# Patient Record
Sex: MALE | Race: WHITE | NOT HISPANIC OR LATINO | Employment: OTHER | URBAN - METROPOLITAN AREA
[De-identification: names, ages, dates, MRNs, and addresses within clinical notes are randomized per-mention and may not be internally consistent; named-entity substitution may affect disease eponyms.]

---

## 2017-03-27 ENCOUNTER — OFFICE VISIT - HEALTHEAST (OUTPATIENT)
Dept: FAMILY MEDICINE | Facility: CLINIC | Age: 82
End: 2017-03-27

## 2017-03-27 DIAGNOSIS — Z48.02 VISIT FOR SUTURE REMOVAL: ICD-10-CM

## 2017-03-27 ASSESSMENT — MIFFLIN-ST. JEOR: SCORE: 1477.72

## 2017-05-02 ENCOUNTER — OFFICE VISIT - HEALTHEAST (OUTPATIENT)
Dept: FAMILY MEDICINE | Facility: CLINIC | Age: 82
End: 2017-05-02

## 2017-05-02 DIAGNOSIS — I10 ESSENTIAL HYPERTENSION WITH GOAL BLOOD PRESSURE LESS THAN 140/90: ICD-10-CM

## 2017-05-02 DIAGNOSIS — I48.0 PAROXYSMAL ATRIAL FIBRILLATION (H): ICD-10-CM

## 2017-05-02 DIAGNOSIS — E78.00 HYPERCHOLESTEREMIA: ICD-10-CM

## 2017-05-02 LAB
CHOLEST SERPL-MCNC: 113 MG/DL
FASTING STATUS PATIENT QL REPORTED: YES
HDLC SERPL-MCNC: 41 MG/DL
LDLC SERPL CALC-MCNC: 61 MG/DL
TRIGL SERPL-MCNC: 54 MG/DL

## 2017-05-03 ENCOUNTER — COMMUNICATION - HEALTHEAST (OUTPATIENT)
Dept: FAMILY MEDICINE | Facility: CLINIC | Age: 82
End: 2017-05-03

## 2017-10-17 ENCOUNTER — COMMUNICATION - HEALTHEAST (OUTPATIENT)
Dept: FAMILY MEDICINE | Facility: CLINIC | Age: 82
End: 2017-10-17

## 2017-10-17 DIAGNOSIS — I10 HYPERTENSION: ICD-10-CM

## 2017-11-09 ENCOUNTER — OFFICE VISIT - HEALTHEAST (OUTPATIENT)
Dept: FAMILY MEDICINE | Facility: CLINIC | Age: 82
End: 2017-11-09

## 2017-11-09 DIAGNOSIS — E78.00 HYPERCHOLESTEREMIA: ICD-10-CM

## 2017-11-09 DIAGNOSIS — Z00.01 ENCOUNTER FOR GENERAL ADULT MEDICAL EXAMINATION WITH ABNORMAL FINDINGS: ICD-10-CM

## 2017-11-09 DIAGNOSIS — I48.0 PAROXYSMAL ATRIAL FIBRILLATION (H): ICD-10-CM

## 2017-11-09 DIAGNOSIS — R63.4 ABNORMAL WEIGHT LOSS: ICD-10-CM

## 2017-11-09 DIAGNOSIS — I25.10 CORONARY ARTERY DISEASE INVOLVING NATIVE CORONARY ARTERY OF NATIVE HEART WITHOUT ANGINA PECTORIS: ICD-10-CM

## 2017-11-09 DIAGNOSIS — I10 ESSENTIAL HYPERTENSION WITH GOAL BLOOD PRESSURE LESS THAN 140/90: ICD-10-CM

## 2017-11-09 DIAGNOSIS — R53.83 FATIGUE: ICD-10-CM

## 2017-11-09 LAB
CHOLEST SERPL-MCNC: 129 MG/DL
FASTING STATUS PATIENT QL REPORTED: YES
HDLC SERPL-MCNC: 41 MG/DL
LDLC SERPL CALC-MCNC: 72 MG/DL
TRIGL SERPL-MCNC: 81 MG/DL

## 2017-11-09 ASSESSMENT — MIFFLIN-ST. JEOR: SCORE: 1424.99

## 2017-11-10 ENCOUNTER — COMMUNICATION - HEALTHEAST (OUTPATIENT)
Dept: FAMILY MEDICINE | Facility: CLINIC | Age: 82
End: 2017-11-10

## 2018-02-05 ENCOUNTER — COMMUNICATION - HEALTHEAST (OUTPATIENT)
Dept: FAMILY MEDICINE | Facility: CLINIC | Age: 83
End: 2018-02-05

## 2018-02-05 ENCOUNTER — OFFICE VISIT - HEALTHEAST (OUTPATIENT)
Dept: FAMILY MEDICINE | Facility: CLINIC | Age: 83
End: 2018-02-05

## 2018-02-05 DIAGNOSIS — I48.0 PAROXYSMAL ATRIAL FIBRILLATION (H): ICD-10-CM

## 2018-02-05 DIAGNOSIS — J11.1 INFLUENZA: ICD-10-CM

## 2018-02-05 DIAGNOSIS — R26.81 UNSTEADINESS: ICD-10-CM

## 2018-02-05 DIAGNOSIS — J18.9 COMMUNITY ACQUIRED PNEUMONIA OF RIGHT LOWER LOBE OF LUNG: ICD-10-CM

## 2018-02-05 DIAGNOSIS — I95.9 HYPOTENSION: ICD-10-CM

## 2018-02-05 ASSESSMENT — MIFFLIN-ST. JEOR: SCORE: 1448.24

## 2018-02-07 ENCOUNTER — OFFICE VISIT - HEALTHEAST (OUTPATIENT)
Dept: FAMILY MEDICINE | Facility: CLINIC | Age: 83
End: 2018-02-07

## 2018-02-07 DIAGNOSIS — Z09 FOLLOW-UP EXAMINATION: ICD-10-CM

## 2018-02-07 ASSESSMENT — MIFFLIN-ST. JEOR: SCORE: 1452.78

## 2018-02-13 ENCOUNTER — COMMUNICATION - HEALTHEAST (OUTPATIENT)
Dept: FAMILY MEDICINE | Facility: CLINIC | Age: 83
End: 2018-02-13

## 2018-03-28 ENCOUNTER — COMMUNICATION - HEALTHEAST (OUTPATIENT)
Dept: FAMILY MEDICINE | Facility: CLINIC | Age: 83
End: 2018-03-28

## 2018-03-28 ENCOUNTER — RECORDS - HEALTHEAST (OUTPATIENT)
Dept: GENERAL RADIOLOGY | Facility: CLINIC | Age: 83
End: 2018-03-28

## 2018-03-28 ENCOUNTER — OFFICE VISIT - HEALTHEAST (OUTPATIENT)
Dept: FAMILY MEDICINE | Facility: CLINIC | Age: 83
End: 2018-03-28

## 2018-03-28 DIAGNOSIS — J18.9 PNEUMONIA, UNSPECIFIED ORGANISM: ICD-10-CM

## 2018-03-28 DIAGNOSIS — J18.9 CAP (COMMUNITY ACQUIRED PNEUMONIA): ICD-10-CM

## 2018-03-28 DIAGNOSIS — I48.0 PAROXYSMAL ATRIAL FIBRILLATION (H): ICD-10-CM

## 2018-03-28 DIAGNOSIS — I50.32 CHRONIC DIASTOLIC HEART FAILURE (H): ICD-10-CM

## 2018-03-28 DIAGNOSIS — I10 ESSENTIAL HYPERTENSION WITH GOAL BLOOD PRESSURE LESS THAN 140/90: ICD-10-CM

## 2018-03-28 DIAGNOSIS — D69.6 THROMBOCYTOPENIA (H): ICD-10-CM

## 2018-03-28 LAB
ANION GAP SERPL CALCULATED.3IONS-SCNC: 6 MMOL/L (ref 5–18)
BNP SERPL-MCNC: 465 PG/ML (ref 0–93)
BUN SERPL-MCNC: 13 MG/DL (ref 8–28)
CALCIUM SERPL-MCNC: 9.1 MG/DL (ref 8.5–10.5)
CHLORIDE BLD-SCNC: 106 MMOL/L (ref 98–107)
CO2 SERPL-SCNC: 31 MMOL/L (ref 22–31)
CREAT SERPL-MCNC: 0.82 MG/DL (ref 0.7–1.3)
ERYTHROCYTE [DISTWIDTH] IN BLOOD BY AUTOMATED COUNT: 12.7 % (ref 11–14.5)
GFR SERPL CREATININE-BSD FRML MDRD: >60 ML/MIN/1.73M2
GLUCOSE BLD-MCNC: 76 MG/DL (ref 70–125)
HCT VFR BLD AUTO: 43.1 % (ref 40–54)
HGB BLD-MCNC: 14.6 G/DL (ref 14–18)
MCH RBC QN AUTO: 34.5 PG (ref 27–34)
MCHC RBC AUTO-ENTMCNC: 33.9 G/DL (ref 32–36)
MCV RBC AUTO: 102 FL (ref 80–100)
PLATELET # BLD AUTO: 208 THOU/UL (ref 140–440)
PMV BLD AUTO: 8.1 FL (ref 7–10)
POTASSIUM BLD-SCNC: 4.3 MMOL/L (ref 3.5–5)
RBC # BLD AUTO: 4.24 MILL/UL (ref 4.4–6.2)
SODIUM SERPL-SCNC: 143 MMOL/L (ref 136–145)
WBC: 6.4 THOU/UL (ref 4–11)

## 2018-09-19 ENCOUNTER — OFFICE VISIT - HEALTHEAST (OUTPATIENT)
Dept: FAMILY MEDICINE | Facility: CLINIC | Age: 83
End: 2018-09-19

## 2018-09-19 DIAGNOSIS — N13.8 BPH WITH URINARY OBSTRUCTION: ICD-10-CM

## 2018-09-19 DIAGNOSIS — I10 ESSENTIAL HYPERTENSION WITH GOAL BLOOD PRESSURE LESS THAN 140/90: ICD-10-CM

## 2018-09-19 DIAGNOSIS — Z00.01 ENCOUNTER FOR GENERAL ADULT MEDICAL EXAMINATION WITH ABNORMAL FINDINGS: ICD-10-CM

## 2018-09-19 DIAGNOSIS — I25.10 CORONARY ARTERY DISEASE INVOLVING NATIVE CORONARY ARTERY OF NATIVE HEART WITHOUT ANGINA PECTORIS: ICD-10-CM

## 2018-09-19 DIAGNOSIS — D75.89 MACROCYTOSIS: ICD-10-CM

## 2018-09-19 DIAGNOSIS — N40.1 BPH WITH URINARY OBSTRUCTION: ICD-10-CM

## 2018-09-19 DIAGNOSIS — I48.0 PAROXYSMAL ATRIAL FIBRILLATION (H): ICD-10-CM

## 2018-09-19 DIAGNOSIS — I50.32 CHRONIC DIASTOLIC HEART FAILURE (H): ICD-10-CM

## 2018-09-19 LAB
ANION GAP SERPL CALCULATED.3IONS-SCNC: 8 MMOL/L (ref 5–18)
BNP SERPL-MCNC: 237 PG/ML (ref 0–93)
BUN SERPL-MCNC: 14 MG/DL (ref 8–28)
CALCIUM SERPL-MCNC: 9.3 MG/DL (ref 8.5–10.5)
CHLORIDE BLD-SCNC: 104 MMOL/L (ref 98–107)
CHOLEST SERPL-MCNC: 113 MG/DL
CO2 SERPL-SCNC: 31 MMOL/L (ref 22–31)
CREAT SERPL-MCNC: 0.84 MG/DL (ref 0.7–1.3)
ERYTHROCYTE [DISTWIDTH] IN BLOOD BY AUTOMATED COUNT: 11.8 % (ref 11–14.5)
FASTING STATUS PATIENT QL REPORTED: YES
FOLATE SERPL-MCNC: 13.6 NG/ML
GFR SERPL CREATININE-BSD FRML MDRD: >60 ML/MIN/1.73M2
GLUCOSE BLD-MCNC: 80 MG/DL (ref 70–125)
HCT VFR BLD AUTO: 44.2 % (ref 40–54)
HDLC SERPL-MCNC: 44 MG/DL
HGB BLD-MCNC: 15.2 G/DL (ref 14–18)
LDLC SERPL CALC-MCNC: 55 MG/DL
MCH RBC QN AUTO: 33.9 PG (ref 27–34)
MCHC RBC AUTO-ENTMCNC: 34.4 G/DL (ref 32–36)
MCV RBC AUTO: 99 FL (ref 80–100)
PLATELET # BLD AUTO: 166 THOU/UL (ref 140–440)
PMV BLD AUTO: 8.3 FL (ref 7–10)
POTASSIUM BLD-SCNC: 3.9 MMOL/L (ref 3.5–5)
RBC # BLD AUTO: 4.49 MILL/UL (ref 4.4–6.2)
SODIUM SERPL-SCNC: 143 MMOL/L (ref 136–145)
TRIGL SERPL-MCNC: 69 MG/DL
VIT B12 SERPL-MCNC: 251 PG/ML (ref 213–816)
WBC: 6.3 THOU/UL (ref 4–11)

## 2018-09-19 ASSESSMENT — MIFFLIN-ST. JEOR: SCORE: 1430.1

## 2018-09-20 ENCOUNTER — COMMUNICATION - HEALTHEAST (OUTPATIENT)
Dept: FAMILY MEDICINE | Facility: CLINIC | Age: 83
End: 2018-09-20

## 2020-10-03 ENCOUNTER — COMMUNICATION - HEALTHEAST (OUTPATIENT)
Dept: SCHEDULING | Facility: CLINIC | Age: 85
End: 2020-10-03

## 2020-10-05 ENCOUNTER — COMMUNICATION - HEALTHEAST (OUTPATIENT)
Dept: SCHEDULING | Facility: CLINIC | Age: 85
End: 2020-10-05

## 2020-10-05 ENCOUNTER — COMMUNICATION - HEALTHEAST (OUTPATIENT)
Dept: CARDIOLOGY | Facility: CLINIC | Age: 85
End: 2020-10-05

## 2020-10-14 ENCOUNTER — COMMUNICATION - HEALTHEAST (OUTPATIENT)
Dept: VASCULAR SURGERY | Facility: CLINIC | Age: 85
End: 2020-10-14

## 2021-05-26 ENCOUNTER — RECORDS - HEALTHEAST (OUTPATIENT)
Dept: ADMINISTRATIVE | Facility: CLINIC | Age: 86
End: 2021-05-26

## 2021-05-27 ENCOUNTER — RECORDS - HEALTHEAST (OUTPATIENT)
Dept: ADMINISTRATIVE | Facility: CLINIC | Age: 86
End: 2021-05-27

## 2021-05-29 ENCOUNTER — RECORDS - HEALTHEAST (OUTPATIENT)
Dept: ADMINISTRATIVE | Facility: CLINIC | Age: 86
End: 2021-05-29

## 2021-05-30 VITALS — BODY MASS INDEX: 23.33 KG/M2 | WEIGHT: 172 LBS

## 2021-05-30 VITALS — HEIGHT: 72 IN | BODY MASS INDEX: 23.43 KG/M2 | WEIGHT: 173 LBS

## 2021-05-31 VITALS — BODY MASS INDEX: 22.96 KG/M2 | WEIGHT: 164 LBS | HEIGHT: 71 IN

## 2021-06-01 VITALS — BODY MASS INDEX: 21.74 KG/M2 | HEIGHT: 73 IN | WEIGHT: 164 LBS

## 2021-06-01 VITALS — WEIGHT: 160 LBS | BODY MASS INDEX: 21.11 KG/M2

## 2021-06-01 VITALS — BODY MASS INDEX: 21.6 KG/M2 | HEIGHT: 73 IN | WEIGHT: 163 LBS

## 2021-06-02 VITALS — WEIGHT: 159 LBS | HEIGHT: 73 IN | BODY MASS INDEX: 21.07 KG/M2

## 2021-06-09 NOTE — PROGRESS NOTES
Assessment:     1. Visit for suture removal         Plan:     1. Visit for suture removal  Wound care was discussed patient is fused loosefitting socks      Subjective:   This patient is being seen for stitch removal he somehow lacerated his left ankle area near the external malleolus about 11:00 on retiring he states that it was squirting blood he could not get it stopped he went to the emergency room 3 stitches were placed apparently the patient somehow lacerated a small artery he does take a baby aspirin he presents today the wound looks fine 3 stitches vinyl tape were removed without incident no bleeding I did discuss that the patient should wear some loose fitting socks and to be careful because of his thin skin patient understands all questions that were asked were answered first visit for this patient with me    Review of Systems: A complete 14 point review of systems was obtained and is negative or as stated in the history of present illness.    Past Medical History:   Diagnosis Date     Hypertension      Myocardial infarction      Family History   Problem Relation Age of Onset     Stroke Father      Past Surgical History:   Procedure Laterality Date     CORONARY STENT PLACEMENT      x 3     Social History   Substance Use Topics     Smoking status: Former Smoker     Smokeless tobacco: Never Used     Alcohol use No         Objective:   /88  Pulse 67  Ht 6' (1.829 m)  Wt 173 lb (78.5 kg)  SpO2 97%  BMI 23.46 kg/m2    General Appearance:  Normal  Head:  Normal  Ears: Normal  Eyes:  Normal  Nose:  Normal  Throat:  Normal  Neck:  Normal  Back:  Normal  Chest/Breast:Normal  Lungs:  Normal  Heart:  Normal  Abdomen:  Normal  Musculoskeletal:  Normal  Lymphatic:  Normal  Skin/Hair/Nails: 3 stitches removed from left ankle near external malleolus  Neurologic:  Normal  Extremities:  Normal  Genitourinary: Normal  Pulses:  Normal           This note has been dictated using voice recognition software. Any  grammatical or context distortions are unintentional and inherent to the the software.

## 2021-06-10 NOTE — PROGRESS NOTES
Assessment:    1. Paroxysmal atrial fibrillation  Basic Metabolic Panel    Thyroid Stimulating Hormone (TSH)   2. Essential hypertension with goal blood pressure less than 140/90  Basic Metabolic Panel   3. Hypercholesteremia  Lipid Cascade         Plan:    Appears to be in sinus rhythm currently.  Will check basic metabolic panel for medication monitoring with underlying hypertension and paroxysmal atrial fibrillation.  Ensure normal TSH level.  Check fasting lipid cascade today with history of hyperlipidemia currently on simvastatin 40 mg using half tablet at bedtime.  Remains on atenolol 50 mg twice daily lisinopril 10 mg using half tablet once daily for hypertension management and rate control.  Anticipate annual wellness visit in 6 months.  Immunizations reviewed and up-to-date.  Blood pressure on recheck 136/84.        Subjective:    Gavin Patel is seen today for follow-up evaluation.  Doing well.  Blood pressures outside of office typically in the 130-140 range over 70s-80s.  Pulse is usually in the 60s.  No orthostatic hypotension described.  No palpitations.  No chest pain.  Patient has declined warfarin anticoagulation in the past as well as switching from atenolol to sotalol etc. for rate control with history of paroxysmal atrial fibrillation.  Continues tamsulosin 0.4 mg daily for BPH management otherwise is on simvastatin 40 mg using half tablet at bedtime for lipid management.  Patient is fasting would like to have labs rechecked today ideally.    Reviewed office notes from October 18, 2016 regarding new diagnosis atrial fibrillation this summer. History of coronary artery disease status post MI 1998 status post angioplasty with stent placement ×3. Underlying history of hypertension. Continues atenolol 50 mg but now taking twice daily along with lisinopril 10 mg using half tablet (5 mg) daily. [facility] fills medications except for atenolol. Prior atrial fibrillation in 2006 with subsequent  "conversion to sinus rhythm. Continues aspirin 81 mg daily. Hesitant to use warfarin anticoagulation. States blood pressure 130-150s/80-90s before increasing atenolol to twice daily. Now blood pressures 110-130s/60-70s after dose adjustment. Denies significant palpitations. No presyncopal symptoms. States feels better with blood pressures in the 140 systolic. Labs reviewed from  with magnesium level, electrolytes, TSH etc. normal. Reviewed cardiology consult note with Dr. Soler patient declines warfarin or consideration of watchman device, switch from atenolol to sotalol, cardiac stress testing etc. Request DNR/DNI code status with paperwork to be signed by PCP. Echocardiogram completed 2016 with EF 60%. No significant wall motion abnormalities noted.      after 30 years   Girlfriend   5 children originally (daughter  of breast CA) - two have DM   Quit smoking age 35 y.o. ()   EtOH: infrequent glass of wine (h/o drinking but quit on own due to heart palpitations worsening)   Surgeries: carpal tunnel  bilateral; angioplasty  with 3 stents at that time   Episode ~  of \"heart beating really fast\" -> took meds x 6 months than \"gradually went of it\"   Mom -  96 y.o. 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 Jackson,   Patient of yours referred to me for evaluation of coronary disease and atrial fibrillation.   He declines anticoagulation, would not consider watchman  since he needs to be on anticoagulation for about 4 months.   He declines stress testing to see if ischemia would be causing his atrial fibrillation.  He does not want any invasive procedures due to embolic phenomenon in  from prior stents.   I suggested switching his atenolol over to sotalol 40 mg or 80 mg twice a " day which he declines.  This would be for rhythm control.   Nothing else to add, should he decide to have us intervene please let me know.   Larry Soler    Past Surgical History:   Procedure Laterality Date     CORONARY STENT PLACEMENT      x 3        Family History   Problem Relation Age of Onset     Stroke Father         Past Medical History:   Diagnosis Date     Hypertension      Myocardial infarction         Social History   Substance Use Topics     Smoking status: Former Smoker     Smokeless tobacco: Never Used     Alcohol use No        Current Outpatient Prescriptions   Medication Sig Dispense Refill     aspirin 81 MG EC tablet Take 81 mg by mouth daily.       atenolol (TENORMIN) 50 MG tablet Take 1 tablet (50 mg total) by mouth 2 (two) times a day. (Patient taking differently: Take 50 mg by mouth daily. ) 180 tablet 1     latanoprost (XALATAN) 0.005 % ophthalmic solution Administer 1 drop to both eyes bedtime.       lisinopril (PRINIVIL,ZESTRIL) 10 MG tablet Take 0.5 tablets (5 mg total) by mouth daily. (Patient taking differently: Take 10 mg by mouth daily. ) 45 tablet 1     omega-3 fatty acids-vitamin E (FISH OIL) 1,000 mg cap Take 2 capsules by mouth daily.       simvastatin (ZOCOR) 40 MG tablet Take 20 mg by mouth bedtime.        tamsulosin (FLOMAX) 0.4 mg Cp24 Take 0.4 mg by mouth daily.       No current facility-administered medications for this visit.           Objective:    Vitals:    05/02/17 0959   BP: 140/80   Pulse: 64   Weight: 172 lb (78 kg)      Body mass index is 23.33 kg/(m^2).    Alert.  No apparent distress.  Chest clear.  Cardiac exam appears regular rate and rhythm.  No murmur or ectopy.  Abdomen benign.  Extremities warm and dry.  Weight at goal.  No peripheral edema noted.  No rash.

## 2021-06-12 NOTE — TELEPHONE ENCOUNTER
"Writer spoke with pt to see if he wanted to make a f/u appt. With Dr. Krishnamurthy from the hospital. Noted to have AAA. Pt stated,  \"that he does not want any treatment and he is just going to live it\".     Writer informed him OK to call back if he changes his mind.   "

## 2021-06-12 NOTE — TELEPHONE ENCOUNTER
Wellness Screening Tool  Symptom Screening:  Do you have one of the following NEW symptoms:    Fever (subjective or >100.0)?  No    A new cough?  No    Shortness of breath?  No     Chills? No     New loss of taste or smell? No     Generalized body aches? No     New persistent headache? No     New sore throat? No     Nausea, vomiting, or diarrhea?  No    Within the past 2 weeks, have you been exposed to someone with a known positive illness below:    COVID-19 (known or suspected)?  No    Chicken pox?  No    Mealses?  No    Pertussis?  No    Patient notified of visitor policy- They may have one person accompany them to their appointment, but they will need to wear a mask and will be screened upon arrival for symptoms: Yes  Pt informed to wear a mask: Yes  Pt notified if they develop any symptoms listed above, prior to their appointment, they are to call the clinic directly at [phone] for further instructions.  Yes  Patient's appointment status: Patient will be seen in clinic as scheduled on 10/6

## 2021-06-14 NOTE — PROGRESS NOTES
Assessment and Plan:       1. Encounter for general adult medical examination with abnormal findings  Annual wellness visit completed.  Risks associated with some dietary indiscretions as well as chronic hearing loss for which he uses bilateral hearing aids with benefit.  Immunizations reviewed and up-to-date.  Annual wellness visits to continue.    2. Essential hypertension with goal blood pressure less than 140/90  Blood pressure recheck 166/86 today.  Patient continues atenolol 50 mg twice daily as well as lisinopril 10 mg using half tablet daily and states feels better when blood pressures are around 140/80 and states blood pressures at home often lower than this however diastolic blood pressures can be into the 90s at times.  Patient declines further medication adjustments and will reassess at follow-up per patient request late March 2018.  - Comprehensive Metabolic Panel    3. Paroxysmal atrial fibrillation  Appears to remain in sinus rhythm.  Magnesium and electrolytes pending regarding atrial fibrillation monitoring.  Also TSH.  Patient declines follow-up with Dr. Soler or cardiology and has declined watchman device, warfarin anticoagulation, switch from atenolol to sotalol etc.  - Comprehensive Metabolic Panel  - Magnesium    4. Hypercholesteremia  Patient requests repeat lipid cascade today and continue simvastatin 40 mg using half tablet at bedtime.  - Lipid [place]    5. Coronary artery disease involving native coronary artery of native heart without angina pectoris  Discussed stable CAD without recurrent chest pain etc. status post angioplasty with stent placement ×3 in 1998.    6. Fatigue  Discussed fatigue likely multifactorial however we will check comprehensive metabolic panel, TSH and CBC today.  Patient declines echocardiogram for further evaluation of left ventricular systolic function etc. with history of CAD, paroxysmal atrial fibrillation, etc.  - HM2(CBC w/o Differential)  - Thyroid  Stimulating Hormone (TSH)  - Comprehensive Metabolic Panel    7. Abnormal weight loss  Discussed likely 40 pound weight loss however feels her weight has stabilized.  Patient declines further intervention at this time however did complete lab monitoring as noted above.  Declines chest x-ray, abdominal/pelvis CT etc.  Reassess at scheduled follow-up office visit to ensure stable or improved weight with BMI 22.7 at this time.       The patient's current medical problems were reviewed.    I have had an Advance Directives discussion with the patient.     The following health maintenance schedule was reviewed with the patient and provided in printed form in the after visit summary:   Health Maintenance   Topic Date Due     ADVANCE DIRECTIVES DISCUSSED WITH PATIENT  01/05/1948     ZOSTER VACCINE  01/05/1990     FALL RISK ASSESSMENT  05/02/2018     TD 18+ HE  02/04/2026     PNEUMOCOCCAL POLYSACCHARIDE VACCINE AGE 65 AND OVER  Completed     INFLUENZA VACCINE RULE BASED  Completed     PNEUMOCOCCAL CONJUGATE VACCINE FOR ADULTS (PCV13 OR PREVNAR)  Completed        Subjective:   Chief Complaint: Gavin Patel is an 87 y.o. male here for an Annual Wellness visit.     HPI: 87-year-old gentleman seen today for annual wellness visit.  In general doing well however does have fatigue issues.  Nonspecific.  Has had nearly 40 pound weight loss over the years and does not have as much of an appetite however feels that weight loss has stabilized and does not feel that is necessarily a concern.  Still maintaining muscle tone.  Using atenolol 50 mg twice daily and lisinopril 10 mg using half tablet daily for hypertension management with history of paroxysmal atrial fibrillation.  Has not noted recent palpitations.  History of known history of CAD status post MI 1998 with subsequent angioplasty and stent placement ×3.  Denies fevers or chills.  No nausea vomiting.  No diarrhea or constipation.  No abdominal complaints.      "after 30 years   Girlfriend   5 children originally (daughter  of breast CA) - two have DM   Quit smoking age 35 y.o. ()   EtOH: infrequent glass of wine (h/o drinking but quit on own due to heart palpitations worsening)   Surgeries: carpal tunnel  bilateral; angioplasty  with 3 stents at that time   Episode ~  of \"heart beating really fast\" -> took meds x 6 months than \"gradually went of it\"   Mom -  96 y.o. 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 Jcakson,   Patient of yours referred to me for evaluation of coronary disease and atrial fibrillation.   He declines anticoagulation, would not consider watchman  since he needs to be on anticoagulation for about 4 months.   He declines stress testing to see if ischemia would be causing his atrial fibrillation.  He does not want any invasive procedures due to embolic phenomenon in  from prior stents.   I suggested switching his atenolol over to sotalol 40 mg or 80 mg twice a day which he declines.  This would be for rhythm control.   Nothing else to add, should he decide to have us intervene please let me know.   Larry Soler    Review of Systems:  Please see above.  The rest of the review of systems are negative for all systems.    Patient Care Team:  Hiro Jackson MD as PCP - General     Patient Active Problem List   Diagnosis     Stroke Syndrome     Polymyalgia Rheumatica     Skin Neoplasm Of Uncertain Behavior     Sinus Bradycardia     Urinary Frequency More Than Twice At Night (Nocturia)     Coronary Arteriosclerosis     Benign Prostatic Hypertrophy With Urinary Obstruction     Hypercholesteremia     Hypertension     Atrial Fibrillation     Allergic Rhinitis     Vitamin B12 Deficiency     Carpal Tunnel Syndrome     Lumbar Disc Degeneration     " "Osteoarthritis Of The Knee     Fatigue     Normochromic normocytic anemia     Atypical pigmented skin lesion     Past Medical History:   Diagnosis Date     Hypertension      Myocardial infarction       Past Surgical History:   Procedure Laterality Date     CORONARY STENT PLACEMENT      x 3      Family History   Problem Relation Age of Onset     Stroke Father       Social History     Social History     Marital status:      Spouse name: N/A     Number of children: N/A     Years of education: N/A     Occupational History     Not on file.     Social History Main Topics     Smoking status: Former Smoker     Smokeless tobacco: Never Used     Alcohol use No     Drug use: No     Sexual activity: Not on file     Other Topics Concern     Not on file     Social History Narrative      Current Outpatient Prescriptions   Medication Sig Dispense Refill     aspirin 81 MG EC tablet Take 81 mg by mouth daily.       atenolol (TENORMIN) 50 MG tablet TAKE 1 TABLET(50 MG) BY MOUTH TWICE DAILY 180 tablet 1     latanoprost (XALATAN) 0.005 % ophthalmic solution Administer 1 drop to both eyes bedtime.       lisinopril (PRINIVIL,ZESTRIL) 10 MG tablet Take 0.5 tablets (5 mg total) by mouth daily. (Patient taking differently: Take 10 mg by mouth daily. ) 45 tablet 1     omega-3 fatty acids-vitamin E (FISH OIL) 1,000 mg cap Take 2 capsules by mouth daily.       simvastatin (ZOCOR) 40 MG tablet Take 20 mg by mouth bedtime.        tamsulosin (FLOMAX) 0.4 mg Cp24 Take 0.4 mg by mouth daily.       No current facility-administered medications for this visit.       Objective:   Vital Signs:   Visit Vitals     /90     Pulse (!) 56     Ht 5' 11.25\" (1.81 m)     Wt 164 lb (74.4 kg)     BMI 22.71 kg/m2        VisionScreening:  No exam data present     PHYSICAL EXAM    General Appearance:    Alert, cooperative, no distress, appears stated age.  Transfers independently without significant difficulty.   Head:    Normocephalic, without obvious " abnormality, atraumatic   Eyes:    PERRL, conjunctiva/corneas clear, EOM's intact, fundi     benign, both eyes        Ears:    Normal TM's and external ear canals, both ears.  Bilateral hearing aids in place.   Nose:   Nares normal, septum midline, mucosa normal, no drainage    or sinus tenderness   Throat:   Lips, mucosa, and tongue normal; upper denture with partial lower.   Neck:   Supple, symmetrical, trachea midline, no adenopathy;        thyroid:  No enlargement/tenderness/nodules; no carotid    bruit or JVD   Back:     Symmetric, no curvature, ROM normal, no CVA tenderness   Lungs:     Clear to auscultation bilaterally, respirations unlabored   Chest wall:    No tenderness or deformity   Heart:    Regular rate and rhythm, S1 and S2 normal, no murmur, rub   or gallop   Abdomen:     Soft, non-tender, bowel sounds active all four quadrants,     no masses, no organomegaly.     Genitalia:    Normal male without lesion, discharge or tenderness.  No inguinal hernia noted.     Rectal:    Normal tone.  Prostate mildly enlarged/symmetric, no masses or tenderness.   Extremities:   Extremities normal, atraumatic, no cyanosis or edema   Pulses:   2+ and symmetric all extremities   Skin:   Skin color, texture, turgor normal, no rashes.  Multiple benign seborrheic keratoses noted.   Lymph nodes:   Cervical, supraclavicular, and axillary nodes normal   Neurologic:   CNII-XII intact. Normal strength, sensation and reflexes       throughout        Assessment Results 11/9/2017   Activities of Daily Living No help needed   Instrumental Activities of Daily Living No help needed   Get Up and Go Score Less than 12 seconds   Mini Cog Total Score 3   Some recent data might be hidden     A Mini-Cog score of 0-2 suggests the possibility of dementia, score of 3-5 suggests no dementia    Identified Health Risks:     The patient was counseled and encouraged to consider modifying their diet and eating habits. He was provided with  information on recommended healthy diet options.  The patient was provided with written information regarding signs of hearing loss.  Patient's advanced directive was discussed and I am comfortable with the patient's wishes.         TTE procedure:ECHO COMPLETE.  Procedure Date  Date: 08/18/2016 Start: 09:58 AM     Study Location: [facility]  Technical Quality: Adequate visualization     Patient Status: Routine     Height: 72 inches Weight: 170 pounds BSA: 1.99 m^2 BMI: 23.06 kg/m^2     BP: 120/80 mmHg     Indications  Atrial fibrillation.      Conclusions       Summary   Normal left ventricular size and systolic function.   Left ventricular ejection fraction is calculated to be 60%.   Mild aortic regurgitation.

## 2021-06-15 NOTE — PROGRESS NOTES
Assessment/Plan:     Patient presents to clinic for follow-up from emergency department visit where he was diagnosed with mild dehydration which responded well to gentle fluid resuscitation, influenza but too late for Tamiflu, and community-acquired pneumonia treated with Levaquin.  Since that time he has been focused on resting, taking his medication as indicated, and careful ambulation as he does feel more wobbly and weak.  He has not fallen.    Encourage patient to continue the antibiotics.  Encouraged patient to focus on hydration, especially something like Pedialyte or boost which also has calories patient explained some anorexia during his period of illness.  Decreased patient's atenolol from 50 mg twice daily to 50 mg once daily during this period of hypotension likely secondary to dehydration.  In addition, patient is feeling unsteady on his feet, do not want any orthostatic hypotension to increase the odds of a fall.    Patient originally registered at 82% at rest for oxygen saturation, but his fingertips were very cold.  This was repeated after warming and was hovering around 90%.  Patient endorsed no shortness of breath during the examination, no increased work of breathing, and was able to speak in full sentences.  Discussed with patient that if he has any worsening shortness of breath it is imperative that he calls 911 and not to wait.  Patient was amenable to this plan, and will follow up in 2 days in clinic.  They will call with any worsening symptoms.    Patient needs a repeat chest x-ray in approximately 4-6 weeks to follow-up on the image view during his emergency department visit.    Patient has a history of atrial fibrillation, currently feels unsteady on his feet, will not initiate any blood thinning medication at this time, is unlikely that this will ever be started given patient's age and comorbidities.  Will have a longer discussion and patient is feeling better, or refer back to primary  care provider.    Problem List Items Addressed This Visit        ENT/CARD/PULM/ENDO Problems    Atrial Fibrillation - Primary      Other Visit Diagnoses     Influenza        Community acquired pneumonia of right lower lobe of lung        Unsteadiness        Hypotension              AVS printed and given to patient.  Return to clinic in 2 days.    Total time spent with patient was 45 minutes with greater than 50% spent in face-to-face counseling regarding the above plan.    This note has been dictated using voice recognition software. Any grammatical or context distortions are unintentional and inherent to the the software.     Anita Delaney MD  Family Medicine [facility]      Subjective:      Gavin Patel is a 88 y.o. male who presents to clinic for follow-up from emergency department visit.    Patient presented in the emergency department on February 1 and was diagnosed with influenza, but too late for treatment with Tamiflu.  He was also diagnosed community-acquired pneumonia and was initiated on Levaquin.  He was also found to be in atrial fibrillation which she has been in previously.  He is not currently on any blood thinning medication aside from a baby aspirin.  The chest x-ray at that visit demonstrated since a suspicion for a lobar pneumonia in the right lower lobe, but with a recommendation of the radiologist to follow-up with an additional x-ray in 4-6 weeks.  Was also found to be dehydrated and gentle fluid resuscitation improved patient's symptoms during his brief period in the hospital.    Since that admission patient does feel slightly better but also feels weak and wobbly.  He states he understands he was dehydrated and presented to the emergency department but has been focusing on hydration at home.  He states he still could likely be drinking more.  He does feel weaker than normal, and needs to use a cane to walk around his house which is atypical for him.  He does not live alone,  "but his housemate is a female friend who is of equal infirmity and was also hospitalized the same time the patient presented emergency department for influenza.    He denies significant shortness of breath, but does state that he gets \"winded\" more frequently when he exerts himself.  He has been trying to take it easy since he was seen in the emergency department.  Denies fever.  He denies significant cough although there is still some additions improved from his ED visit.      Past Medical History, Family History, and Social History reviewed.     Review of systems is as stated in HPI.  Patient endorses: fever, weakness, fatigue, hearing changes, dizziness, hoarseness, wheezing, SOB, swelling in legs, diarrhea and easy bruising  The remainder of the 10 system review is otherwise negative.    Objective:     /80  Pulse 74  Temp 97.5  F (36.4  C)  Ht 6' 1\" (1.854 m)  Wt 163 lb (73.9 kg)  SpO2 91% Comment: RA at rest  BMI 21.51 kg/m2 Body mass index is 21.51 kg/(m^2).    Gen: Alert, NAD, appears stated age, normal hygiene   Eyes: conjunctivae without injection, sclera clear, EOMI  ENT/mouth: nares clear, septum midline, absent rhinorrhea, throat without injection, neck is supple, no thyroid enlargement, hard of hearing  CV: suspected atrial fibrillation as irregular rhythm, no murmur appreciated, pedal edema absent bilaterally  Resp: CTAB, no wheezes, rales or ronchi but poor resp effort  ABD: normoactive, non-tender to palpation, nondistended  MSK: grossly full range of motion in all joints, no obvious deformity  Neuro: Poor facial movement on the left side, no deficits in coordination  Psych: no apparent hallucinations or delusions, no pressured speech; alert, oriented x3  SKIN: dry and without lesions  Heme/lymph: no pallor, no active bleeding/bruising, no adenopathy appreciated      Current Outpatient Prescriptions on File Prior to Visit   Medication Sig Dispense Refill     aspirin 81 MG EC tablet Take " 81 mg by mouth at bedtime.        atenolol (TENORMIN) 50 MG tablet Take 50 mg by mouth 2 (two) times a day.       latanoprost (XALATAN) 0.005 % ophthalmic solution Administer 1 drop to both eyes bedtime.       levoFLOXacin (LEVAQUIN) 500 MG tablet Take 1.5 tablets (750 mg total) by mouth daily for 9 days. (Patient taking differently: Take 500 mg by mouth daily. ) 9 tablet 0     lisinopril (PRINIVIL,ZESTRIL) 10 MG tablet Take 0.5 tablets (5 mg total) by mouth daily. 45 tablet 1     omega-3 fatty acids-vitamin E (FISH OIL) 1,000 mg cap Take 2 capsules by mouth daily.       simvastatin (ZOCOR) 40 MG tablet Take 20 mg by mouth bedtime.        tamsulosin (FLOMAX) 0.4 mg Cp24 Take 0.4 mg by mouth daily as needed.        No current facility-administered medications on file prior to visit.

## 2021-06-15 NOTE — PROGRESS NOTES
Assessment/Plan:     Patient presents to clinic today for follow-up after a decrease in oxygen saturation appreciated 2 days ago and a known history of both influenza and suspected community-acquired pneumonia status post antibiotic treatment.  He is also previously unsteady on his feet but today appears much more steady.  He has been taking a protein, and his oxygen saturation today was 97%.  He feels well.  Encouraged patient to call immediately if he has any concerns of her shortness of breath or unsteadiness, but otherwise care as normal.    Patient's atrial fibrillation has now been appreciated on several different examinations.  I still feel he is too unsteady to initiate on anticoagulation.  This will need to be addressed with his primary care or with myself at a later date.    Problem List Items Addressed This Visit     None      Visit Diagnoses     Follow-up examination    -  Primary          Total time spent with patient was 15 minutes with greater than 50% spent in face-to-face counseling regarding the above plan.    This note has been dictated using voice recognition software. Any grammatical or context distortions are unintentional and inherent to the the software.     Anita Delaney MD  Family Medicine [facility]      Subjective:      Gavin Patel is a 88 y.o. male who presents to clinic for follow-up after his visit 2 days ago.  Patient was recently seen in the emergency department for shortness of breath secondary to suspected influenza and likely bacterial community-acquired right lower lobe pneumonia.  Patient was unsteady on his feet, and had a very low oxygen saturation.  He does not have any home oxygen.  His oxygen saturation at that visit was approximately 90% which is atypical for the patient.  Had previously encouraged rest, monitoring for shortness of breath, and boost shakes or any increase in calories.    Patient has been doing well since that time.  He endorses very  "minimal shortness of breath.  His resting appropriately.  He is still using a cane but he does this in the [place] in general.  He feels much more steady on his feet.  He has been drinking boost but does not like the protein bars his daughter keeps bringing him.      Past Medical History, Family History, and Social History reviewed.     Review of systems is as stated in HPI.  The remainder of the 10 system review is otherwise negative.    Objective:     /68  Pulse 73  Ht 6' 1\" (1.854 m)  Wt 164 lb (74.4 kg)  SpO2 97%  BMI 21.64 kg/m2 Body mass index is 21.64 kg/(m^2).    Gen: Alert, NAD, appears stated age, normal hygiene   CV: irregular rhythm, no murmur appreciated, pedal edema absent bilaterally  Resp: CTAB, no wheezes, rales or ronchi; improved examination from last appointment  ABD: normoactive, non-tender to palpation, nondistended  MSK: grossly full range of motion in all joints, no obvious deformity  Neuro: Left facial hemiparesis  Psych: no apparent hallucinations or delusions, no pressured speech; alert, oriented x3  SKIN: dry and without lesions  Heme/lymph: no pallor, no active bleeding/bruising, no adenopathy appreciated      Current Outpatient Prescriptions on File Prior to Visit   Medication Sig Dispense Refill     aspirin 81 MG EC tablet Take 81 mg by mouth at bedtime.        atenolol (TENORMIN) 50 MG tablet Take 50 mg by mouth 2 (two) times a day.       latanoprost (XALATAN) 0.005 % ophthalmic solution Administer 1 drop to both eyes bedtime.       levoFLOXacin (LEVAQUIN) 500 MG tablet Take 1.5 tablets (750 mg total) by mouth daily for 9 days. (Patient taking differently: Take 500 mg by mouth daily. ) 9 tablet 0     lisinopril (PRINIVIL,ZESTRIL) 10 MG tablet Take 0.5 tablets (5 mg total) by mouth daily. 45 tablet 1     omega-3 fatty acids-vitamin E (FISH OIL) 1,000 mg cap Take 2 capsules by mouth daily.       simvastatin (ZOCOR) 40 MG tablet Take 20 mg by mouth bedtime.        tamsulosin " (FLOMAX) 0.4 mg Cp24 Take 0.4 mg by mouth daily as needed.        No current facility-administered medications on file prior to visit.

## 2021-06-16 PROBLEM — I50.32 CHRONIC DIASTOLIC HEART FAILURE (H): Status: ACTIVE | Noted: 2018-09-19

## 2021-06-16 PROBLEM — I25.10 CORONARY ARTERY DISEASE INVOLVING NATIVE CORONARY ARTERY OF NATIVE HEART WITHOUT ANGINA PECTORIS: Status: ACTIVE | Noted: 2018-09-19

## 2021-06-16 PROBLEM — D75.89 MACROCYTOSIS: Status: ACTIVE | Noted: 2018-09-19

## 2021-06-16 NOTE — PROGRESS NOTES
Assessment/Plan:    1. Essential hypertension with goal blood pressure less than 140/90  Hypertension stable with blood pressure 142/86 and patient declines further medication adjustment stating that he feels better with blood pressure slightly elevated.  Continues lisinopril 10 mg using half tablet daily plus atenolol 50 mg twice daily.  - Basic Metabolic Panel    2. Paroxysmal atrial fibrillation  Continues aspirin 81 mg daily.  Exam today consistent with sinus rhythm with ventricular rate 64 bpm.  Likely paroxysmal atrial fibrillation history.  Basic metabolic panel pending for med monitoring and continues atenolol 50 mg twice daily for rate control and aspirin 81 mg for anticoagulation with falls risk etc. poor candidate for warfarin anticoagulation.  - Basic Metabolic Panel    3. CAP (community acquired pneumonia)  Described prior community-acquired pneumonia with influenza A results positive February 1, 2018.  Repeat chest x-ray was obtained today and will have radiologist review to ensure resolved abnormalities.  - XR Chest 2 Views; Future    4. Thrombocytopenia  Repeat CBC with mild thrombocytopenia, resolved with platelet count 208,000.  - HM2(CBC w/o Differential)    5. Chronic diastolic heart failure  Likely chronic diastolic heart failure.  Will repeat BNP level which had been elevated at 584 February 1, 2018 otherwise no evidence for systolic heart failure with prior echocardiogram with normal ejection fraction etc.  - BNP(B-type Natriuretic Peptide)          Subjective:    Gavin DEVYN Patel is seen today for follow-up evaluation.  Underlying hypertension.  Continues lisinopril 10 mg using half tablet daily and atenolol 50 mg twice daily.  No palpitations.  No orthopnea or PND.  No presyncopal symptoms.  Known history of CAD in the past otherwise no recurrence of angina etc.  Simvastatin 40 mg using half tablet at bedtime.  Tamsulosin 0.4 mg daily for BPH management.  No peripheral edema.  Patient has  "declined follow-up with Dr. Soler or cardiology and has declined watchman device, warfarin anticoagulation, switch from atenolol to sotalol, etc.  Discussed stable CAD without recurrent chest pain etc. status post angioplasty with stent placement ×3 in .     after 30 years   Girlfriend   5 children originally (daughter  of breast CA) - two have DM   Quit smoking age 35 y.o. ()   EtOH: infrequent glass of wine (h/o drinking but quit on own due to heart palpitations worsening)   Surgeries: carpal tunnel  bilateral; angioplasty  with 3 stents at that time   Episode ~  of \"heart beating really fast\" -> took meds x 6 months than \"gradually went of it\"   Mom -  96 y.o. 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        atenolol (TENORMIN) 50 MG tablet Take 50 mg by mouth 2 (two) times a day.       latanoprost (XALATAN) 0.005 % ophthalmic solution Administer 1 drop to both eyes bedtime.       lisinopril (PRINIVIL,ZESTRIL) 10 MG tablet Take 0.5 tablets (5 mg total) by mouth daily. 45 tablet 1     omega-3 fatty acids-vitamin E (FISH OIL) 1,000 mg cap Take 2 capsules by mouth daily.       simvastatin (ZOCOR) 40 MG tablet Take 20 mg by mouth bedtime.        tamsulosin (FLOMAX) 0.4 " mg Cp24 Take 0.4 mg by mouth daily as needed.        No current facility-administered medications for this visit.           Objective:    Vitals:    03/28/18 0952 03/28/18 1019   BP: 140/80 142/86   Pulse: 64    SpO2: 98%    Weight: 160 lb (72.6 kg)       Body mass index is 21.11 kg/(m^2).    Alert.  No apparent distress.  Chest with diminished breath sounds without expiratory wheeze.  Cardiac exam today does appear regular in nature and rate controlled.  Extremities warm and dry without peripheral edema.  No rash.        XR CHEST 2 VIEWS  2/1/2018 11:51 AM     INDICATION: Cough.  COMPARISON: None.     FINDINGS:      The lungs are grossly clear on the frontal view, however, on the lateral view mild opacity projects over the lower lobes and pneumonia is possible. Recommend 4-6 week follow-up for clearing.      No pleural effusion or pneumothorax. Normal heart size. Coronary coronary artery stenting. Tortuous aorta. Degenerative changes spine.     NOTE: ABNORMAL REPORT  THE DICTATION ABOVE DESCRIBES AN ABNORMALITY FOR WHICH FOLLOW-UP IS NEEDED.           TTE procedure:ECHO COMPLETE.  Procedure Date  Date: 08/18/2016 Start: 09:58 AM      Study Location: [facility]  Technical Quality: Adequate visualization      Patient Status: Routine      Height: 72 inches Weight: 170 pounds BSA: 1.99 m^2 BMI: 23.06 kg/m^2      BP: 120/80 mmHg      Indications  Atrial fibrillation.       Conclusions       Summary   Normal left ventricular size and systolic function.   Left ventricular ejection fraction is calculated to be 60%.   Mild aortic regurgitation.          This note has been dictated using voice recognition software and as a result may contain minor grammatical errors and unintended word substitutions.

## 2021-06-20 NOTE — PROGRESS NOTES
Assessment and Plan:       1. Encounter for general adult medical examination with abnormal findings  Annual wellness visit completed.  Risks associated with suboptimal diet.  Prior fall after misstep at home.  Hearing loss that he does utilize bilateral hearing aids with benefit.  Indication of potential anxiety however BASILIA 7 questionnaire 121.  Patient defers high-dose flu shot until later in October otherwise immunizations reviewed and up-to-date.  Did recommend Shingrix immunization through local pharmacy however.    2. Essential hypertension with goal blood pressure less than 140/90  Hypertension, stable with blood pressure 138/82 and continues lisinopril 10 mg using half tablet daily and atenolol 50 mg twice daily.  - Basic Metabolic Panel    3. Paroxysmal atrial fibrillation (H)  History of paroxysmal atrial fibrillation.  Continues sinus rhythm at this time.  Med monitoring completed.  - Basic Metabolic Panel    4. Chronic diastolic heart failure (H)  Check BNP level as well as basic metabolic panel for chronic diastolic heart failure.  Prior echocardiogram in 2016 with EF 60%.  No orthopnea or PND.  No peripheral edema concerns.  We will continue to monitor closely.  - Basic Metabolic Panel  - BNP(B-type Natriuretic Peptide)    5. Coronary artery disease involving native coronary artery of native heart without angina pectoris  History of CAD, stable.  Asymptomatic without exertional dyspnea, chest pain etc.  - Lipid Cascade    6. Macrocytosis  History of macrocytosis with prior  and hemoglobin 14.6 March 28, 2018.  Repeat CBC as well as vitamin B12 and folate levels.  - HM2(CBC w/o Differential)  - Vitamin B12  - Folate, Serum    7. BPH with urinary obstruction  Use of tamsulosin 0.4 mg every other day apparently.  Did recommend most benefit from daily use consistent.  Patient will implement this and reassess at follow-up in 6 months, sooner with concerns.        The patient's current medical  "problems were reviewed.    I have had an Advance Directives discussion with the patient.     The following health maintenance schedule was reviewed with the patient and provided in printed form in the after visit summary:   Health Maintenance   Topic Date Due     ZOSTER VACCINE  1990     FALL RISK ASSESSMENT  2018     INFLUENZA VACCINE RULE BASED (1) 2018     ADVANCE DIRECTIVES DISCUSSED WITH PATIENT  2022     TD 18+ HE  2026     PNEUMOCOCCAL POLYSACCHARIDE VACCINE AGE 65 AND OVER  Completed     PNEUMOCOCCAL CONJUGATE VACCINE FOR ADULTS (PCV13 OR PREVNAR)  Completed        Subjective:     Chief Complaint: Gavin Patel is an 88 y.o. male here for an Annual Wellness visit.     HPI: Patient seen today for annual wellness visit.  In general doing well.  Known history of hypertension.  Treated with atenolol 50 mg twice daily and lisinopril 10 mg using half tablet daily.  History of paroxysmal atrial fibrillation noted.  Prior macrocytosis with  without history of B12 or folate deficiency.  Known history of diastolic heart failure with prior BNP level elevation 584 with ejection fraction 60% on echocardiogram .  Denies concerns for polymyalgia rheumatica at this time.  Known history of CAD, stable.    Review of Systems:  Please see above.  The rest of the review of systems are negative for all systems.     after 30 years   Girlfriend   5 children originally (daughter  of breast CA) - two have DM   Quit smoking age 35 y.o. ()   EtOH: infrequent glass of wine (h/o drinking but quit on own due to heart palpitations worsening)   Surgeries: carpal tunnel 1975 bilateral; angioplasty  with 3 stents at that time   Episode ~  of \"heart beating really fast\" -> took meds x 6 months than \"gradually went of it\"   Mom -  96 y.o. 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     Patient Care Team:  Hiro Jackson MD as PCP - General     Patient Active Problem List   Diagnosis     Stroke Syndrome     Polymyalgia Rheumatica     Skin Neoplasm Of Uncertain Behavior     Sinus Bradycardia     Urinary Frequency More Than Twice At Night ([place]cturia)     Coronary Arteriosclerosis     BPH with urinary obstruction     Hypercholesteremia     Essential hypertension with goal blood pressure less than 140/90     Atrial Fibrillation     Allergic Rhinitis     Vitamin B12 Deficiency     Carpal Tunnel Syndrome     Lumbar Disc Degeneration     Osteoarthritis Of The Knee     Fatigue     Normochromic normocytic anemia     Atypical pigmented skin lesion     Macrocytosis     Coronary artery disease involving native coronary artery of native heart without angina pectoris     Chronic diastolic heart failure (H)     Past Medical History:   Diagnosis Date     Atrial fibrillation (H)      Hypertension      Myocardial infarction       Past Surgical History:   Procedure Laterality Date     CORONARY STENT PLACEMENT      x 3      Family History   Problem Relation Age of Onset     Stroke Father       Social History     Social History     Marital status:      Spouse name: N/A     Number of children: N/A     Years of education: N/A     Occupational History     Not on file.     Social History Main Topics     Smoking status: Former Smoker     Smokeless tobacco: Never Used     Alcohol use No     Drug use: No     Sexual activity: Not on file     Other Topics Concern     Not on file     Social History Narrative      Current Outpatient Prescriptions   Medication Sig Dispense Refill     aspirin 81 MG EC tablet Take 81 mg by mouth at bedtime.        atenolol (TENORMIN) 50 MG tablet Take 50 mg by mouth 2 (two) times a day.       latanoprost (XALATAN) 0.005 % ophthalmic solution Administer 1 drop to both eyes bedtime.       lisinopril  "(PRINIVIL,ZESTRIL) 10 MG tablet Take 0.5 tablets (5 mg total) by mouth daily. 45 tablet 1     omega-3 fatty acids-vitamin E (FISH OIL) 1,000 mg cap Take 2 capsules by mouth daily.       simvastatin (ZOCOR) 40 MG tablet Take 20 mg by mouth bedtime.        tamsulosin (FLOMAX) 0.4 mg Cp24 Take 0.4 mg by mouth daily as needed.        No current facility-administered medications for this visit.       Objective:   Vital Signs:   Visit Vitals     /72     Pulse 61     Ht 6' 1\" (1.854 m)     Wt 159 lb (72.1 kg)     SpO2 98%     BMI 20.98 kg/m2        VisionScreening:  No exam data present     PHYSICAL EXAM    General Appearance:    Alert, cooperative, no distress, appears stated age.     Head:    Normocephalic, without obvious abnormality, atraumatic   Eyes:    PERRL, conjunctiva/corneas clear, EOM's intact, fundi     benign, both eyes.        Ears:    Normal TM's and external ear canals, both ears.  Bilateral hearing aids.   Nose:   Nares normal, septum midline, mucosa normal, no drainage    or sinus tenderness   Throat:   Lips, mucosa, and tongue normal; teeth and gums normal   Neck:   Supple, symmetrical, trachea midline, no adenopathy;        thyroid:  No enlargement/tenderness/nodules; no carotid    bruit or JVD   Back:     Symmetric, no curvature, ROM normal, no CVA tenderness   Lungs:     Clear to auscultation bilaterally, respirations unlabored   Chest wall:    No tenderness or deformity   Heart:    Regular rate and rhythm, S1 and S2 normal, no murmur, rub   or gallop   Abdomen:     Soft, non-tender, bowel sounds active all four quadrants,     no masses, no organomegaly.     Genitalia:    Normal male without lesion, discharge or tenderness.  No inguinal hernia noted.     Rectal:    Normal tone.  Prostate mildly enlarged/symmetric, no masses or tenderness.   Extremities:   Extremities normal, atraumatic, no cyanosis or edema   Pulses:   2+ and symmetric all extremities   Skin:   Skin color, texture, turgor " normal, no rashes or lesions   Lymph nodes:   Cervical, supraclavicular, and axillary nodes normal   Neurologic:   CNII-XII intact. Normal strength, sensation and reflexes       throughout            TTE procedure:ECHO COMPLETE.  Procedure Date  Date: 08/18/2016 Start: 09:58 AM     Study Location: [facility]  Technical Quality: Adequate visualization     Patient Status: Routine     Height: 72 inches Weight: 170 pounds BSA: 1.99 m^2 BMI: 23.06 kg/m^2     BP: 120/80 mmHg     Indications  Atrial fibrillation.      Conclusions       Summary   Normal left ventricular size and systolic function.   Left ventricular ejection fraction is calculated to be 60%.   Mild aortic regurgitation.          Assessment Results 9/19/2018   Activities of Daily Living No help needed   Instrumental Activities of Daily Living No help needed   Get Up and Go Score Less than 12 seconds   Mini Cog Total Score 4   Some recent data might be hidden     A Mini-Cog score of 0-2 suggests the possibility of dementia, score of 3-5 suggests no dementia    Identified Health Risks:     The patient was counseled and encouraged to consider modifying their diet and eating habits. He was provided with information on recommended healthy diet options.  The patient was provided with written information regarding signs of hearing loss.  He is at risk for falling and has been provided with information to reduce the risk of falling at home.  Patient's advanced directive was discussed and I am comfortable with the patient's wishes.

## 2023-01-18 ENCOUNTER — HOSPITAL ENCOUNTER (OUTPATIENT)
Facility: CLINIC | Age: 88
Setting detail: OBSERVATION
Discharge: HOME | End: 2023-01-19
Attending: EMERGENCY MEDICINE | Admitting: EMERGENCY MEDICINE
Payer: MEDICARE

## 2023-01-18 ENCOUNTER — APPOINTMENT (OUTPATIENT)
Dept: RADIOLOGY | Facility: CLINIC | Age: 88
End: 2023-01-18
Attending: EMERGENCY MEDICINE
Payer: MEDICARE

## 2023-01-18 DIAGNOSIS — E53.8 VITAMIN B12 DEFICIENCY (NON ANEMIC): Primary | ICD-10-CM

## 2023-01-18 DIAGNOSIS — M62.81 GENERALIZED MUSCLE WEAKNESS: ICD-10-CM

## 2023-01-18 DIAGNOSIS — R53.81 PHYSICAL DECONDITIONING: ICD-10-CM

## 2023-01-18 DIAGNOSIS — M35.3 POLYMYALGIA RHEUMATICA (H): ICD-10-CM

## 2023-01-18 LAB
ALBUMIN UR-MCNC: 100 MG/DL
ANION GAP SERPL CALCULATED.3IONS-SCNC: 8 MMOL/L (ref 5–18)
APPEARANCE UR: CLEAR
ATRIAL RATE - MUSE: 138 BPM
BILIRUB UR QL STRIP: NEGATIVE
BUN SERPL-MCNC: 22 MG/DL (ref 8–28)
CALCIUM SERPL-MCNC: 8.1 MG/DL (ref 8.5–10.5)
CHLORIDE BLD-SCNC: 104 MMOL/L (ref 98–107)
CO2 SERPL-SCNC: 23 MMOL/L (ref 22–31)
COLOR UR AUTO: YELLOW
CREAT SERPL-MCNC: 0.83 MG/DL (ref 0.7–1.3)
DIASTOLIC BLOOD PRESSURE - MUSE: 67 MMHG
ERYTHROCYTE [DISTWIDTH] IN BLOOD BY AUTOMATED COUNT: 12.7 % (ref 10–15)
FLUAV RNA SPEC QL NAA+PROBE: NEGATIVE
FLUBV RNA RESP QL NAA+PROBE: NEGATIVE
GFR SERPL CREATININE-BSD FRML MDRD: 82 ML/MIN/1.73M2
GLUCOSE BLD-MCNC: 109 MG/DL (ref 70–125)
GLUCOSE UR STRIP-MCNC: NEGATIVE MG/DL
HCT VFR BLD AUTO: 36.6 % (ref 40–53)
HGB BLD-MCNC: 12.2 G/DL (ref 13.3–17.7)
HGB UR QL STRIP: ABNORMAL
HOLD SPECIMEN: NORMAL
HYALINE CASTS: 19 /LPF
INTERPRETATION ECG - MUSE: NORMAL
KETONES UR STRIP-MCNC: ABNORMAL MG/DL
LACTATE SERPL-SCNC: 1.6 MMOL/L (ref 0.7–2)
LEUKOCYTE ESTERASE UR QL STRIP: NEGATIVE
MAGNESIUM SERPL-MCNC: 1.7 MG/DL (ref 1.8–2.6)
MCH RBC QN AUTO: 34.4 PG (ref 26.5–33)
MCHC RBC AUTO-ENTMCNC: 33.3 G/DL (ref 31.5–36.5)
MCV RBC AUTO: 103 FL (ref 78–100)
MUCOUS THREADS #/AREA URNS LPF: PRESENT /LPF
NITRATE UR QL: NEGATIVE
P AXIS - MUSE: NORMAL DEGREES
PH UR STRIP: 6 [PH] (ref 5–7)
PLATELET # BLD AUTO: 96 10E3/UL (ref 150–450)
POTASSIUM BLD-SCNC: 3.4 MMOL/L (ref 3.5–5)
POTASSIUM BLD-SCNC: 3.5 MMOL/L (ref 3.5–5)
PR INTERVAL - MUSE: NORMAL MS
QRS DURATION - MUSE: 94 MS
QT - MUSE: 426 MS
QTC - MUSE: 535 MS
R AXIS - MUSE: -20 DEGREES
RBC # BLD AUTO: 3.55 10E6/UL (ref 4.4–5.9)
RBC URINE: 18 /HPF
RSV RNA SPEC NAA+PROBE: NEGATIVE
SARS-COV-2 RNA RESP QL NAA+PROBE: NEGATIVE
SODIUM SERPL-SCNC: 135 MMOL/L (ref 136–145)
SP GR UR STRIP: 1.02 (ref 1–1.03)
SQUAMOUS EPITHELIAL: 1 /HPF
SYSTOLIC BLOOD PRESSURE - MUSE: 114 MMHG
T AXIS - MUSE: 78 DEGREES
TROPONIN I SERPL-MCNC: 0.03 NG/ML (ref 0–0.29)
TSH SERPL DL<=0.005 MIU/L-ACNC: 0.33 UIU/ML (ref 0.3–5)
UROBILINOGEN UR STRIP-MCNC: 4 MG/DL
VENTRICULAR RATE- MUSE: 95 BPM
WBC # BLD AUTO: 12.3 10E3/UL (ref 4–11)
WBC URINE: 4 /HPF

## 2023-01-18 PROCEDURE — 99223 1ST HOSP IP/OBS HIGH 75: CPT | Mod: AI | Performed by: HOSPITALIST

## 2023-01-18 PROCEDURE — 82607 VITAMIN B-12: CPT | Performed by: HOSPITALIST

## 2023-01-18 PROCEDURE — 84484 ASSAY OF TROPONIN QUANT: CPT | Performed by: EMERGENCY MEDICINE

## 2023-01-18 PROCEDURE — C9803 HOPD COVID-19 SPEC COLLECT: HCPCS

## 2023-01-18 PROCEDURE — 82746 ASSAY OF FOLIC ACID SERUM: CPT | Performed by: HOSPITALIST

## 2023-01-18 PROCEDURE — 83605 ASSAY OF LACTIC ACID: CPT | Performed by: HOSPITALIST

## 2023-01-18 PROCEDURE — 83735 ASSAY OF MAGNESIUM: CPT | Performed by: HOSPITALIST

## 2023-01-18 PROCEDURE — 84443 ASSAY THYROID STIM HORMONE: CPT | Performed by: HOSPITALIST

## 2023-01-18 PROCEDURE — 36415 COLL VENOUS BLD VENIPUNCTURE: CPT | Performed by: HOSPITALIST

## 2023-01-18 PROCEDURE — 84132 ASSAY OF SERUM POTASSIUM: CPT | Mod: 91 | Performed by: HOSPITALIST

## 2023-01-18 PROCEDURE — 250N000013 HC RX MED GY IP 250 OP 250 PS 637: Performed by: HOSPITALIST

## 2023-01-18 PROCEDURE — G0378 HOSPITAL OBSERVATION PER HR: HCPCS

## 2023-01-18 PROCEDURE — 71045 X-RAY EXAM CHEST 1 VIEW: CPT

## 2023-01-18 PROCEDURE — 93005 ELECTROCARDIOGRAM TRACING: CPT | Performed by: EMERGENCY MEDICINE

## 2023-01-18 PROCEDURE — 36415 COLL VENOUS BLD VENIPUNCTURE: CPT | Performed by: EMERGENCY MEDICINE

## 2023-01-18 PROCEDURE — 80048 BASIC METABOLIC PNL TOTAL CA: CPT | Performed by: EMERGENCY MEDICINE

## 2023-01-18 PROCEDURE — 81001 URINALYSIS AUTO W/SCOPE: CPT | Performed by: EMERGENCY MEDICINE

## 2023-01-18 PROCEDURE — 258N000003 HC RX IP 258 OP 636: Performed by: HOSPITALIST

## 2023-01-18 PROCEDURE — 87637 SARSCOV2&INF A&B&RSV AMP PRB: CPT | Performed by: EMERGENCY MEDICINE

## 2023-01-18 PROCEDURE — 85027 COMPLETE CBC AUTOMATED: CPT | Performed by: EMERGENCY MEDICINE

## 2023-01-18 PROCEDURE — 99285 EMERGENCY DEPT VISIT HI MDM: CPT | Mod: 25,CS

## 2023-01-18 PROCEDURE — 96360 HYDRATION IV INFUSION INIT: CPT

## 2023-01-18 RX ORDER — POTASSIUM CHLORIDE 1500 MG/1
40 TABLET, EXTENDED RELEASE ORAL ONCE
Status: COMPLETED | OUTPATIENT
Start: 2023-01-18 | End: 2023-01-18

## 2023-01-18 RX ORDER — SERTRALINE HYDROCHLORIDE 25 MG/1
25 TABLET, FILM COATED ORAL DAILY
COMMUNITY

## 2023-01-18 RX ORDER — ONDANSETRON 4 MG/1
4 TABLET, ORALLY DISINTEGRATING ORAL EVERY 6 HOURS PRN
Status: DISCONTINUED | OUTPATIENT
Start: 2023-01-18 | End: 2023-01-19 | Stop reason: HOSPADM

## 2023-01-18 RX ORDER — AMOXICILLIN 250 MG
2 CAPSULE ORAL 2 TIMES DAILY PRN
Status: DISCONTINUED | OUTPATIENT
Start: 2023-01-18 | End: 2023-01-19 | Stop reason: HOSPADM

## 2023-01-18 RX ORDER — ASPIRIN 81 MG/1
81 TABLET ORAL AT BEDTIME
Status: DISCONTINUED | OUTPATIENT
Start: 2023-01-18 | End: 2023-01-19 | Stop reason: HOSPADM

## 2023-01-18 RX ORDER — BISACODYL 10 MG
10 SUPPOSITORY, RECTAL RECTAL DAILY PRN
Status: DISCONTINUED | OUTPATIENT
Start: 2023-01-18 | End: 2023-01-19 | Stop reason: HOSPADM

## 2023-01-18 RX ORDER — LISINOPRIL 20 MG/1
20 TABLET ORAL DAILY
COMMUNITY

## 2023-01-18 RX ORDER — SERTRALINE HYDROCHLORIDE 25 MG/1
25 TABLET, FILM COATED ORAL DAILY
Status: DISCONTINUED | OUTPATIENT
Start: 2023-01-19 | End: 2023-01-19 | Stop reason: HOSPADM

## 2023-01-18 RX ORDER — LISINOPRIL 20 MG/1
20 TABLET ORAL DAILY
Status: DISCONTINUED | OUTPATIENT
Start: 2023-01-19 | End: 2023-01-19 | Stop reason: HOSPADM

## 2023-01-18 RX ORDER — HEPARIN SODIUM 5000 [USP'U]/.5ML
5000 INJECTION, SOLUTION INTRAVENOUS; SUBCUTANEOUS EVERY 12 HOURS
Status: DISCONTINUED | OUTPATIENT
Start: 2023-01-18 | End: 2023-01-19 | Stop reason: HOSPADM

## 2023-01-18 RX ORDER — LATANOPROST 50 UG/ML
1 SOLUTION/ DROPS OPHTHALMIC AT BEDTIME
Status: DISCONTINUED | OUTPATIENT
Start: 2023-01-18 | End: 2023-01-19 | Stop reason: HOSPADM

## 2023-01-18 RX ORDER — ATENOLOL 25 MG/1
50 TABLET ORAL DAILY
Status: DISCONTINUED | OUTPATIENT
Start: 2023-01-19 | End: 2023-01-19

## 2023-01-18 RX ORDER — SIMVASTATIN 20 MG
20 TABLET ORAL AT BEDTIME
Status: DISCONTINUED | OUTPATIENT
Start: 2023-01-18 | End: 2023-01-19 | Stop reason: HOSPADM

## 2023-01-18 RX ORDER — ACETAMINOPHEN 325 MG/1
650 TABLET ORAL EVERY 6 HOURS PRN
Status: DISCONTINUED | OUTPATIENT
Start: 2023-01-18 | End: 2023-01-19 | Stop reason: HOSPADM

## 2023-01-18 RX ORDER — SODIUM CHLORIDE 9 MG/ML
INJECTION, SOLUTION INTRAVENOUS CONTINUOUS
Status: DISCONTINUED | OUTPATIENT
Start: 2023-01-18 | End: 2023-01-19

## 2023-01-18 RX ORDER — POLYETHYLENE GLYCOL 3350 17 G/17G
17 POWDER, FOR SOLUTION ORAL DAILY PRN
Status: DISCONTINUED | OUTPATIENT
Start: 2023-01-18 | End: 2023-01-19 | Stop reason: HOSPADM

## 2023-01-18 RX ORDER — LIDOCAINE 40 MG/G
CREAM TOPICAL
Status: DISCONTINUED | OUTPATIENT
Start: 2023-01-18 | End: 2023-01-19 | Stop reason: HOSPADM

## 2023-01-18 RX ORDER — AMOXICILLIN 250 MG
1 CAPSULE ORAL 2 TIMES DAILY PRN
Status: DISCONTINUED | OUTPATIENT
Start: 2023-01-18 | End: 2023-01-19 | Stop reason: HOSPADM

## 2023-01-18 RX ORDER — ONDANSETRON 2 MG/ML
4 INJECTION INTRAMUSCULAR; INTRAVENOUS EVERY 6 HOURS PRN
Status: DISCONTINUED | OUTPATIENT
Start: 2023-01-18 | End: 2023-01-19 | Stop reason: HOSPADM

## 2023-01-18 RX ORDER — ACETAMINOPHEN 650 MG/1
650 SUPPOSITORY RECTAL EVERY 6 HOURS PRN
Status: DISCONTINUED | OUTPATIENT
Start: 2023-01-18 | End: 2023-01-19 | Stop reason: HOSPADM

## 2023-01-18 RX ADMIN — SIMVASTATIN 20 MG: 20 TABLET, FILM COATED ORAL at 22:16

## 2023-01-18 RX ADMIN — SODIUM CHLORIDE 500 ML: 9 INJECTION, SOLUTION INTRAVENOUS at 18:31

## 2023-01-18 RX ADMIN — LATANOPROST 1 DROP: 50 SOLUTION/ DROPS OPHTHALMIC at 22:14

## 2023-01-18 RX ADMIN — POTASSIUM CHLORIDE 40 MEQ: 1500 TABLET, EXTENDED RELEASE ORAL at 22:16

## 2023-01-18 RX ADMIN — SODIUM CHLORIDE 1000 ML: 9 INJECTION, SOLUTION INTRAVENOUS at 17:42

## 2023-01-18 RX ADMIN — SODIUM CHLORIDE: 9 INJECTION, SOLUTION INTRAVENOUS at 20:13

## 2023-01-18 RX ADMIN — ASPIRIN 81 MG: 81 TABLET, COATED ORAL at 22:16

## 2023-01-18 ASSESSMENT — ACTIVITIES OF DAILY LIVING (ADL)
ADLS_ACUITY_SCORE: 37
ADLS_ACUITY_SCORE: 37
ADLS_ACUITY_SCORE: 28
ADLS_ACUITY_SCORE: 35
ADLS_ACUITY_SCORE: 28
ADLS_ACUITY_SCORE: 31

## 2023-01-18 NOTE — PHARMACY-ADMISSION MEDICATION HISTORY
Pharmacy Note - Admission Medication History    Pertinent Provider Information: none     ______________________________________________________________________    Prior To Admission (PTA) med list completed and updated in EMR.       PTA Med List   Medication Sig Last Dose     aspirin 81 MG EC tablet [ASPIRIN 81 MG EC TABLET] Take 81 mg by mouth at bedtime.  1/17/2023     atenolol (TENORMIN) 50 MG tablet Take 50 mg by mouth daily 1/18/2023     latanoprost (XALATAN) 0.005 % ophthalmic solution [LATANOPROST (XALATAN) 0.005 % OPHTHALMIC SOLUTION] Administer 1 drop to both eyes bedtime. 1/17/2023     lisinopril (ZESTRIL) 20 MG tablet Take 20 mg by mouth daily 1/17/2023     sertraline (ZOLOFT) 25 MG tablet Take 25 mg by mouth daily 1/18/2023     simvastatin (ZOCOR) 40 MG tablet [SIMVASTATIN (ZOCOR) 40 MG TABLET] Take 20 mg by mouth bedtime.  1/17/2023       Information source(s): Patient, Family member and [facility]  Method of interview communication: in-person    Summary of Changes to PTA Med List  New: all      Patient was asked about OTC/herbal products specifically.  PTA med list reflects this.        The information provided in this note is only as accurate as the sources available at the time of the update(s).    Thank you for the opportunity to participate in the care of this patient.    Miguelangel Giron RPH  1/18/2023 2:07 PM

## 2023-01-18 NOTE — ED TRIAGE NOTES
Called [facility]el[facility] yesterday saying that he wasn't feeling well.    Daughter arrived at patients and he was in bed, soiled with urine and stool.     Patient reports taking a COVID test yesterday and it was negative.    Patient's initial blood pressure was 92/xxx  After 500 ml [facility] repeated blood pressure 110/xxx.    May have had BM en route.

## 2023-01-18 NOTE — H&P
[facility]    History and Physical - Hospitalist Service       Date of Admission:  1/18/2023    Assessment & Plan      Gavin Patel is a 89 year old male admitted on 1/18/2023. He has a hx of HTN, afib, HLD, polymyalgia rheumatica presents with generalized weakness and physical deconditioning, and had hypotension in the ED.    Acute Hypotension - occurring after initial [facility] intake  -MAP < 65 mmHg - IVF bolus ordered STAT  -Lactic acid ordered STAT  -Hold home atenolol and lisinopril (none given yet)  -Discussed POC with RN directly    Generalized Weakness  Physical Deconditioning  ?Vitamin B12 deficiency  -Discontinue potential deliriogenic medications/polypharmacy.   -Check thyroid cascade (TSH first) as well as cyanocobalamin and folic acid levels - evaluate/screen for endocrine and metabolic etiologies, respectively.   -Last B12 checked in 2020 was already borderline low in 250s. Follow-up ordered B12.   -Delirium precautions.   -Fall precautions.   -If agitated or develops acute delirium, utilize verbal redirection first, and involve patient's family/contacts if available. Pharmacologic as-needed interventions as second-line, and would only judiciously consider 1:1 sitter and/or restraints if patient becomes a physical danger to self and/or to others/staff or if previous interventions unsuccessful or not effective.  -PT  -OT  -CM/SW for disposition/placement/facility / safety discharge planning    HTN  Atrial Fibrillation   -Order home medications and ordered specified hold parameters given acute hypotension - hold home atenolol and lisinopril.   -Optimize potassium and magnesium. Keep K > 4.0, Mag > 2.0.   -Consider RN potassium and magnesium replacement protocols.   -If any acute sustained rapid rates develop, obtain EKG to evaluate for RVR and initiate cardiac telemetry and update [facility] team.     HLD  -Order home statin.       Diet: Regular Diet Adult    DVT Prophylaxis: Heparin  SQ, Pneumatic Compression Devices and Anti-embolisim stockings (TEDs)  Cunningham Catheter: Not present  Lines: None     Cardiac Monitoring: None  Code Status:    DNR/DNI    Clinically Significant Risk Factors Present on Admission                # Thrombocytopenia: Lowest platelets = 96 in last 2 days, will monitor for bleeding   # Hypertension: home medication list includes antihypertensive(s)              Disposition Plan      Expected Discharge Date: 01/19/2023                  Peter Ruiz MD  Hospitalist Service  [facility]  Securely message with [employer] (more info)  Text page via [employer] Paging/Directory     ______________________________________________________________________    Chief Complaint   Generalized weakness, unable to care for self at home    History is obtained from the patient    History of Present Illness   Gavin Patel is a 89 year old male admitted on 1/18/2023. He has a hx of HTN, afib, HLD, polymyalgia rheumatica presents with generalized weakness and physical deconditioning, and had hypotension in the ED.    His daughter found him acting out of his usual and failing to follow-through on check-ups and usual routine; he also endorses past several days of difficulty ambulating, completing usual ADLs/iADLs. Otherwise, there is no endorsement of any fevers, rigors, chest pain or shortness of breath, nausea or vomiting or abdominal pain, changes in bowel movements/pattern, urinary symptoms, focal weakness, or any other new and associated symptoms at this time. 14 point review of systems performed without any other pertinent positives at this time.     The social history, family history, and past medical history were directly reviewed. All questions the patient had at this time were answered to verbalized and stated satisfaction and understanding. Code status was discussed and the patient confirmed wishes for DNR/DNI for this hospitalization.      Past Medical History     No past medical history on file.    Past Surgical History   Past Surgical History:   Procedure Laterality Date     CORONARY STENT PLACEMENT      x 3       Prior to Admission Medications   Prior to Admission Medications   Prescriptions Last Dose Informant Patient Reported? Taking?   MEDICATION CANNOT BE REORDERED - PLEASE MANUALLY REORDER AND DISCONTINUE THE OLD ORDER   Yes No   Sig: [OMEGA-3 FATTY ACIDS-VITAMIN E (FISH OIL) 1,000 MG CAP] Take 2 capsules by mouth daily.   aspirin 81 MG EC tablet 1/17/2023  Yes Yes   Sig: [ASPIRIN 81 MG EC TABLET] Take 81 mg by mouth at bedtime.    atenolol (TENORMIN) 50 MG tablet 1/18/2023  Yes Yes   Sig: Take 50 mg by mouth daily   latanoprost (XALATAN) 0.005 % ophthalmic solution 1/17/2023  Yes Yes   Sig: [LATANOPROST (XALATAN) 0.005 % OPHTHALMIC SOLUTION] Administer 1 drop to both eyes bedtime.   lisinopril (ZESTRIL) 20 MG tablet 1/17/2023  Yes Yes   Sig: Take 20 mg by mouth daily   sertraline (ZOLOFT) 25 MG tablet 1/18/2023  Yes Yes   Sig: Take 25 mg by mouth daily   simvastatin (ZOCOR) 40 MG tablet 1/17/2023  Yes Yes   Sig: [SIMVASTATIN (ZOCOR) 40 MG TABLET] Take 20 mg by mouth bedtime.       Facility-Administered Medications: None        Physical Exam   Vital Signs: Temp: 98.1  F (36.7  C) Temp src: Oral BP: 91/56 Pulse: 86   Resp: 25 SpO2: 93 % O2 Device: None (Room air)    Weight: 155 lbs 12.8 oz    GENERAL:  Alert, appears comfortable, in no acute distress, appears stated age   HEAD:  Normocephalic, without obvious abnormality, atraumatic   EYES:  PERRL, conjunctiva/corneas clear, no scleral icterus, EOM's intact   NOSE: Nares normal, septum midline, mucosa normal, no drainage   THROAT: Lips, mucosa, and tongue normal; teeth and gums normal, mouth moist   NECK: Supple, symmetrical, trachea midline   BACK:   Symmetric, no curvature, ROM normal   LUNGS:   Clear to auscultation bilaterally, no rales, rhonchi, or wheezing, symmetric chest rise on inhalation, respirations  unlabored   CHEST WALL:  No tenderness or deformity   HEART:  Regular rate and rhythm, S1 and S2 normal, no murmur, rub, or gallop    ABDOMEN:   Soft, non-tender, bowel sounds active all four quadrants, no masses, no organomegaly, no rebound or guarding   EXTREMITIES: Extremities normal, atraumatic, no cyanosis or edema    SKIN: Dry to touch, no exanthems in the visualized areas   NEURO: Alert, oriented x 4, moves all four extremities freely/spontaneously   PSYCH: Cooperative, behavior is appropriate      Medical Decision Making     81 MINUTES SPENT BY ME on the date of service doing chart review, history, exam, documentation & further activities per the note.      Data     I have personally reviewed the following data over the past 24 hrs:    12.3 (H)  \   12.2 (L)   / 96 (L)     135 (L) 104 22 /  109   3.5 23 0.83 \       Imaging results reviewed over the past 24 hrs:   Recent Results (from the past 24 hour(s))   XR Chest Port 1 View    Narrative    EXAM: XR CHEST PORT 1 VIEW  LOCATION: [facility]  DATE/TIME: 1/18/2023 2:26 PM    INDICATION: cough  COMPARISON: None.      Impression    IMPRESSION: No acute cardiopulmonary abnormality.

## 2023-01-18 NOTE — ED NOTES
Verbal OK to access Care Everywhere for the [facility].  Ekaterina Nolan RN 1/18/2023 1:03 PM

## 2023-01-18 NOTE — ED PROVIDER NOTES
EMERGENCY DEPARTMENT ENCOUnter      NAME: Gavin Patel  AGE: 89 year old male  YOB: 1930  MRN: [id number]  EVALUATION DATE & TIME: 1/18/2023 12:38 PM    PCP: Hiro Jackson    ED PROVIDER: Mango Portillo DO      Chief Complaint   Patient presents with     Dizziness     Diarrhea         FINAL IMPRESSION:  1. Generalized muscle weakness          ED COURSE & MEDICAL DECISION MAKING:    The patient presented to the emergency department today complaining of generalized fatigue.  He has not even been able to get out of bed to go to use the bathroom.  No concerning physical exam findings.  Today laboratory testing is grossly unremarkable but given the extent of his fatigue we will plan to keep him overnight in the hospital for further monitoring and possible placement.  His daughter was at the bedside and provided much of the history.  CT scan of the brain considered but we elected to hold off on this given his lack of focal neurologic symptoms.    Medical Decision Making    History:    Supplemental history from: Family Member/Significant Other    External Record(s) reviewed: Documented in chart, if applicable.    Work Up:    Chart documentation includes differential considered and any EKGs or imaging independently interpreted by provider, where specified.    In additional to work up documented, I considered the following work up: Documented in chart, if applicable.    External consultation:    Discussion of management with another provider: Documented in chart, if applicable    Complicating factors:    Care impacted by chronic illness: N/A    Care affected by social determinants of health: N/A    Disposition considerations: Admit.        At the conclusion of the encounter I discussed the results of all of the tests and the disposition. The questions were answered. The patient or family acknowledged understanding and was agreeable with the care plan.        =================================================================    HPI        Gavin Patel is a 89 year old male who presents to the emergency department today with complaints of generalized fatigue for the past few days.  His daughter is at the bedside and states that he does not seem to be himself lately.  He has had chills at home and a recent cough.  He states he feels very fatigued and is having a hard time getting around.  He was not able to get to the bathroom recently.  He denies any focal chest pain or abdominal pain.  No other current concerns.      REVIEW OF SYSTEMS     Constitutional: Positive for chills  HENT:  Denies sore throat   Respiratory:  Denies cough or shortness of breath   Cardiovascular:  Denies chest pain or palpitations  GI:  Denies abdominal pain, nausea, or vomiting  Musculoskeletal:  Denies any new extremity pain   Skin:  Denies rash   Neurologic:  Denies headache, focal weakness or sensory changes    All other systems reviewed and are negative      PAST MEDICAL HISTORY:  No past medical history on file.    PAST SURGICAL HISTORY:  Past Surgical History:   Procedure Laterality Date     CORONARY STENT PLACEMENT      x 3           CURRENT MEDICATIONS:    No current outpatient medications on file.      ALLERGIES:  No Known Allergies    FAMILY HISTORY:  Family History   Problem Relation Age of Onset     Cerebrovascular Disease Father        SOCIAL HISTORY:   Social History     Socioeconomic History     Marital status:    Tobacco Use     Smoking status: Former     Smokeless tobacco: Never   Substance and Sexual Activity     Alcohol use: No     Drug use: No       VITALS:  Patient Vitals for the past 24 hrs:   BP Temp Temp src Pulse Resp SpO2   01/19/23 1258 (!) 147/96 98.6  F (37  C) Oral 74 18 95 %   01/19/23 0918 95/58 -- -- -- -- --   01/19/23 0917 129/65 -- -- 69 -- 95 %   01/19/23 0826 117/68 97.6  F (36.4  C) Oral 63 18 95 %   01/19/23 0330 115/71 97.3  F (36.3  C)  Oral 60 18 96 %   01/19/23 0142 96/60 -- -- -- -- --   01/19/23 0008 106/66 97.1  F (36.2  C) Oral 59 20 92 %   01/18/23 2306 120/69 -- -- -- -- --   01/18/23 2110 109/79 -- -- -- -- --   01/18/23 2006 109/76 97.4  F (36.3  C) Oral 75 25 93 %   01/18/23 1900 112/73 -- -- 74 -- 92 %   01/18/23 1831 -- -- -- 81 29 93 %   01/18/23 1820 (!) 86/55 -- -- 71 17 93 %   01/18/23 1816 -- -- -- 77 20 93 %   01/18/23 1801 (!) 87/55 -- -- 81 22 94 %   01/18/23 1746 91/56 -- -- 80 24 94 %   01/18/23 1738 91/56 -- -- 86 25 93 %   01/18/23 1731 -- -- -- 86 (!) 31 92 %   01/18/23 1728 (!) 79/51 -- -- 81 26 91 %   01/18/23 1718 (!) 81/44 -- -- 83 23 91 %   01/18/23 1716 -- -- -- 90 (!) 35 90 %   01/18/23 1708 -- -- -- 88 23 91 %   01/18/23 1701 -- -- -- 85 22 92 %   01/18/23 1658 (!) 82/50 -- -- 92 (!) 32 91 %   01/18/23 1646 103/66 -- -- 93 (!) 34 92 %   01/18/23 1631 -- -- -- 90 30 91 %   01/18/23 1616 -- -- -- 98 27 92 %   01/18/23 1601 -- -- -- 86 20 94 %   01/18/23 1600 107/66 -- -- 104 20 93 %   01/18/23 1546 (!) 89/64 -- -- 89 18 93 %   01/18/23 1531 -- -- -- 102 (!) 40 92 %   01/18/23 1527 -- -- -- 89 17 94 %   01/18/23 1516 92/59 -- -- 92 16 94 %   01/18/23 1501 -- -- -- 90 21 (!) 85 %   01/18/23 1457 -- -- -- 91 (!) 9 94 %   01/18/23 1446 96/52 -- -- 86 23 92 %       PHYSICAL EXAM    Constitutional:  Well developed, Well nourished, appears globally fatigued  HENT:  Normocephalic, Atraumatic, Bilateral external ears normal, Oropharynx moist, Nose normal.   Neck:  Normal range of motion, No meningismus, No stridor.   Eyes:  EOMI, Conjunctiva normal, No discharge.   Respiratory:  Normal breath sounds, No respiratory distress, No wheezing, No chest tenderness.   Cardiovascular: Normal rate, irregularly irregular rhythm, no murmurs  GI:  Soft, No tenderness, No guarding, No CVA tenderness.   Musculoskeletal:   No tenderness to palpation or major deformities noted.   Integument:  Warm, Dry, No erythema, No rash.   Neurologic:   Alert & oriented x 3, Normal motor function, Normal sensory function, No focal deficits noted.   Psychiatric:  Affect normal, Judgment normal, Mood normal.      LAB:  All pertinent labs reviewed and interpreted.  Results for orders placed or performed during the hospital encounter of 01/18/23              CBC with platelets   Result Value Ref Range    WBC Count 12.3 (H) 4.0 - 11.0 10e3/uL    RBC Count 3.55 (L) 4.40 - 5.90 10e6/uL    Hemoglobin 12.2 (L) 13.3 - 17.7 g/dL    Hematocrit 36.6 (L) 40.0 - 53.0 %     (H) 78 - 100 fL    MCH 34.4 (H) 26.5 - 33.0 pg    MCHC 33.3 31.5 - 36.5 g/dL    RDW 12.7 10.0 - 15.0 %    Platelet Count 96 (L) 150 - 450 10e3/uL   Basic metabolic panel   Result Value Ref Range    Sodium 135 (L) 136 - 145 mmol/L    Potassium 3.5 3.5 - 5.0 mmol/L    Chloride 104 98 - 107 mmol/L    Carbon Dioxide (CO2) 23 22 - 31 mmol/L    Anion Gap 8 5 - 18 mmol/L    Urea Nitrogen 22 8 - 28 mg/dL    Creatinine 0.83 0.70 - 1.30 mg/dL    Calcium 8.1 (L) 8.5 - 10.5 mg/dL    Glucose 109 70 - 125 mg/dL    GFR Estimate 82 >60 mL/min/1.73m2   UA with Microscopic reflex to Culture    Specimen: Urine, NOS   Result Value Ref Range    Color Urine Yellow Colorless, Straw, Light Yellow, Yellow    Appearance Urine Clear Clear    Glucose Urine Negative Negative mg/dL    Bilirubin Urine Negative Negative    Ketones Urine Trace (A) Negative mg/dL    Specific Gravity Urine 1.021 1.001 - 1.030    Blood Urine 0.1 mg/dL (A) Negative    pH Urine 6.0 5.0 - 7.0    Protein Albumin Urine 100 (A) Negative mg/dL    Urobilinogen Urine 4.0 (A) <2.0 mg/dL    Nitrite Urine Negative Negative    Leukocyte Esterase Urine Negative Negative    Mucus Urine Present (A) None Seen /LPF    RBC Urine 18 (H) <=2 /HPF    WBC Urine 4 <=5 /HPF    Squamous Epithelials Urine 1 <=1 /HPF    Hyaline Casts Urine 19 (H) <=2 /LPF   Troponin I (now)   Result Value Ref Range    Troponin I 0.03 0.00 - 0.29 ng/mL   Symptomatic Influenza A/B & SARS-CoV2  (COVID-19) Virus PCR Multiplex Nasopharyngeal    Specimen: Nasopharyngeal; Swab   Result Value Ref Range    Influenza A PCR Negative Negative    Influenza B PCR Negative Negative    RSV PCR Negative Negative    SARS CoV2 PCR Negative Negative         RADIOLOGY:  I have independently reviewed and interpreted the above imaging, pending the final radiology read.  XR Chest Port 1 View   Final Result   IMPRESSION: No acute cardiopulmonary abnormality.          EKG:    Atrial fibrillation at 95 bpm.  Nonspecific ST changes.  Occasional PVCs.  No signs of acute ischemia.  QRS 94 ms,  ms.    I have independently reviewed and interpreted this EKG            Mango Portillo DO  Emergency Medicine  [facility] EMERGENCY ROOM  [address] [id number] [phone]  Dept: [phone]     Mango Portillo MD  01/19/23 7118

## 2023-01-19 ENCOUNTER — APPOINTMENT (OUTPATIENT)
Dept: PHYSICAL THERAPY | Facility: CLINIC | Age: 88
End: 2023-01-19
Attending: HOSPITALIST
Payer: MEDICARE

## 2023-01-19 ENCOUNTER — APPOINTMENT (OUTPATIENT)
Dept: OCCUPATIONAL THERAPY | Facility: CLINIC | Age: 88
End: 2023-01-19
Attending: HOSPITALIST
Payer: MEDICARE

## 2023-01-19 VITALS
HEART RATE: 75 BPM | BODY MASS INDEX: 21.1 KG/M2 | OXYGEN SATURATION: 96 % | SYSTOLIC BLOOD PRESSURE: 163 MMHG | WEIGHT: 155.8 LBS | DIASTOLIC BLOOD PRESSURE: 88 MMHG | TEMPERATURE: 97.9 F | RESPIRATION RATE: 20 BRPM | HEIGHT: 72 IN

## 2023-01-19 LAB
ANION GAP SERPL CALCULATED.3IONS-SCNC: 8 MMOL/L (ref 5–18)
BASOPHILS # BLD AUTO: 0 10E3/UL (ref 0–0.2)
BASOPHILS NFR BLD AUTO: 0 %
BUN SERPL-MCNC: 21 MG/DL (ref 8–28)
CALCIUM SERPL-MCNC: 7.6 MG/DL (ref 8.5–10.5)
CHLORIDE BLD-SCNC: 110 MMOL/L (ref 98–107)
CO2 SERPL-SCNC: 17 MMOL/L (ref 22–31)
CREAT SERPL-MCNC: 0.71 MG/DL (ref 0.7–1.3)
EOSINOPHIL # BLD AUTO: 0 10E3/UL (ref 0–0.7)
EOSINOPHIL NFR BLD AUTO: 0 %
ERYTHROCYTE [DISTWIDTH] IN BLOOD BY AUTOMATED COUNT: 12.9 % (ref 10–15)
FOLATE SERPL-MCNC: 10.3 NG/ML (ref 4.6–34.8)
GFR SERPL CREATININE-BSD FRML MDRD: 86 ML/MIN/1.73M2
GLUCOSE BLD-MCNC: 76 MG/DL (ref 70–125)
HCT VFR BLD AUTO: 38.9 % (ref 40–53)
HGB BLD-MCNC: 12.9 G/DL (ref 13.3–17.7)
IMM GRANULOCYTES # BLD: 0 10E3/UL
IMM GRANULOCYTES NFR BLD: 0 %
LYMPHOCYTES # BLD AUTO: 1.4 10E3/UL (ref 0.8–5.3)
LYMPHOCYTES NFR BLD AUTO: 15 %
MAGNESIUM SERPL-MCNC: 1.7 MG/DL (ref 1.8–2.6)
MCH RBC QN AUTO: 34.6 PG (ref 26.5–33)
MCHC RBC AUTO-ENTMCNC: 33.2 G/DL (ref 31.5–36.5)
MCV RBC AUTO: 104 FL (ref 78–100)
MONOCYTES # BLD AUTO: 0.9 10E3/UL (ref 0–1.3)
MONOCYTES NFR BLD AUTO: 10 %
NEUTROPHILS # BLD AUTO: 6.7 10E3/UL (ref 1.6–8.3)
NEUTROPHILS NFR BLD AUTO: 75 %
NRBC # BLD AUTO: 0 10E3/UL
NRBC BLD AUTO-RTO: 0 /100
PLATELET # BLD AUTO: 90 10E3/UL (ref 150–450)
POTASSIUM BLD-SCNC: 3.6 MMOL/L (ref 3.5–5)
POTASSIUM BLD-SCNC: 4 MMOL/L (ref 3.5–5)
RBC # BLD AUTO: 3.73 10E6/UL (ref 4.4–5.9)
SODIUM SERPL-SCNC: 135 MMOL/L (ref 136–145)
VIT B12 SERPL-MCNC: 192 PG/ML (ref 232–1245)
WBC # BLD AUTO: 9 10E3/UL (ref 4–11)

## 2023-01-19 PROCEDURE — 82374 ASSAY BLOOD CARBON DIOXIDE: CPT | Performed by: HOSPITALIST

## 2023-01-19 PROCEDURE — 99239 HOSP IP/OBS DSCHRG MGMT >30: CPT | Performed by: HOSPITALIST

## 2023-01-19 PROCEDURE — 36415 COLL VENOUS BLD VENIPUNCTURE: CPT | Performed by: HOSPITALIST

## 2023-01-19 PROCEDURE — G0378 HOSPITAL OBSERVATION PER HR: HCPCS

## 2023-01-19 PROCEDURE — 85025 COMPLETE CBC W/AUTO DIFF WBC: CPT | Performed by: HOSPITALIST

## 2023-01-19 PROCEDURE — 250N000013 HC RX MED GY IP 250 OP 250 PS 637: Performed by: HOSPITALIST

## 2023-01-19 PROCEDURE — 97530 THERAPEUTIC ACTIVITIES: CPT | Mod: GP

## 2023-01-19 PROCEDURE — 97161 PT EVAL LOW COMPLEX 20 MIN: CPT | Mod: GP

## 2023-01-19 PROCEDURE — 97116 GAIT TRAINING THERAPY: CPT | Mod: GP

## 2023-01-19 PROCEDURE — 83735 ASSAY OF MAGNESIUM: CPT | Performed by: HOSPITALIST

## 2023-01-19 PROCEDURE — 97166 OT EVAL MOD COMPLEX 45 MIN: CPT | Mod: GO

## 2023-01-19 PROCEDURE — 80048 BASIC METABOLIC PNL TOTAL CA: CPT | Performed by: HOSPITALIST

## 2023-01-19 PROCEDURE — 97535 SELF CARE MNGMENT TRAINING: CPT | Mod: GO

## 2023-01-19 RX ORDER — LANOLIN ALCOHOL/MO/W.PET/CERES
1000 CREAM (GRAM) TOPICAL DAILY
Qty: 60 TABLET | Refills: 0 | Status: SHIPPED | OUTPATIENT
Start: 2023-01-19 | End: 2023-03-20

## 2023-01-19 RX ORDER — UREA 10 %
500 LOTION (ML) TOPICAL DAILY
Status: DISCONTINUED | OUTPATIENT
Start: 2023-01-19 | End: 2023-01-19 | Stop reason: HOSPADM

## 2023-01-19 RX ORDER — ATENOLOL 50 MG/1
50 TABLET ORAL DAILY
Status: DISCONTINUED | OUTPATIENT
Start: 2023-01-19 | End: 2023-01-19 | Stop reason: HOSPADM

## 2023-01-19 RX ADMIN — CYANOCOBALAMIN TAB 500 MCG 500 MCG: 500 TAB at 10:12

## 2023-01-19 RX ADMIN — SERTRALINE HYDROCHLORIDE 25 MG: 25 TABLET ORAL at 09:20

## 2023-01-19 ASSESSMENT — ACTIVITIES OF DAILY LIVING (ADL)
ADLS_ACUITY_SCORE: 31

## 2023-01-19 NOTE — PROGRESS NOTES
"PRIMARY DIAGNOSIS: \"GENERIC\" NURSING  OUTPATIENT/OBSERVATION GOALS TO BE MET BEFORE DISCHARGE:  1. ADLs back to baseline: No    2. Activity and level of assistance: Up with maximum assistance. Consider SW and/or PT evaluation.     3. Pain status: Pain free.    4. Return to near baseline physical activity: No     Discharge Planner Nurse   Safe discharge environment identified: No  Barriers to discharge: Yes       Entered by: Anabel Hernandez RN 01/19/2023 1:44 AM     Please review provider order for any additional goals.   Nurse to notify provider when observation goals have been met and patient is ready for discharge.  "

## 2023-01-19 NOTE — PROGRESS NOTES
01/19/23 1040   Appointment Info   Signing Clinician's Name / Credentials (PT) Daron Christina, PT, DPT   Quick Adds   Quick Adds Certification   General Information   Onset of Illness/Injury or Date of Surgery 01/18/23   Referring Physician Dr. Ruiz   Patient/Family Therapy Goals Statement (PT) Improve overall mobility   Pertinent History of Current Problem (include personal factors and/or comorbidities that impact the POC) Generalized weakness   Existing Precautions/Restrictions no known precautions/restrictions   Weight-Bearing Status - LLE full weight-bearing   Weight-Bearing Status - RLE full weight-bearing   Range of Motion (ROM)   ROM Comment WFL   Strength (Manual Muscle Testing)   Strength Comments WFL, seems generally weak   Bed Mobility   Bed Mobility supine-sit;sit-supine   Supine-Sit Grant (Bed Mobility) minimum assist (75% patient effort)   Sit-Supine Grant (Bed Mobility) supervision   Comment, (Bed Mobility) Off of art in ED   Transfers   Transfers sit-stand transfer   Sit-Stand Transfer   Sit-Stand Grant (Transfers) contact guard   Assistive Device (Sit-Stand Transfers) walker, front-wheeled   Gait/Stairs (Locomotion)   Grant Level (Gait) contact guard   Assistive Device (Gait) walker, front-wheeled   Distance in Feet 10'   Distance in Feet (Gait) 200'   Pattern (Gait) step-through   Deviations/Abnormal Patterns (Gait) cheryl decreased;stride length decreased   Clinical Impression   Criteria for Skilled Therapeutic Intervention Yes, treatment indicated   PT Diagnosis (PT) Impaired functional mobility   Influenced by the following impairments Weakness   Functional limitations due to impairments Transfers, gait   Clinical Presentation (PT Evaluation Complexity) Stable/Uncomplicated   Clinical Presentation Rationale Pt presents as medically diagnosed   Clinical Decision Making (Complexity) low complexity   Planned Therapy Interventions (PT) gait training;patient/family  education;stair training;strengthening;transfer training   Anticipated Equipment Needs at Discharge (PT) walker, rolling   Risk & Benefits of therapy have been explained care plan/treatment goals reviewed;patient   PT Total Evaluation Time   PT Eval, Low Complexity Minutes (48594) 10   Therapy Certification   Start of care date 01/19/23   Certification date from 01/19/23   Certification date to 02/19/23   Medical Diagnosis Generalized weakness   Physical Therapy Goals   PT Frequency Daily   PT Predicted Duration/Target Date for Goal Attainment 01/25/23   PT Goals Transfers;Gait   PT: Transfers Supervision/stand-by assist;Sit to/from stand   PT: Gait Supervision/stand-by assist;Rolling walker;Greater than 200 feet   Interventions   Interventions Quick Adds Gait Training;Therapeutic Activity   Therapeutic Activity   Therapeutic Activities: dynamic activities to improve functional performance Minutes (51796) 10   Symptoms Noted During/After Treatment None   Treatment Detail/Skilled Intervention Supine to sit Min A with pt pulling up on PT to get to EOB, SBA at EOB, sitting with no adverse s/s. Sit<>stand CGA. Increased time for set up. Cues for sequencing and safety.   Gait Training   Gait Training Minutes (62905) 10   Symptoms Noted During/After Treatment (Gait Training) fatigue;shortness of breath   Treatment Detail/Skilled Intervention Pt amb moderately well in the halls CGA with FWW, moving slowly but gait speed improving at end of amb, no LOB or adverse s/s. Reports some SOB after amb. O2 sats 93% on RA after activity. Cues for navigation, safety, and AD use (normally uses 4WW).   [place] Level (Gait Training) contact guard   Physical Assistance Level (Gait Training) supervision;verbal cues   Weight Bearing (Gait Training) full weight-bearing   Assistive Device (Gait Training) rolling walker   Pattern Analysis (Gait Training) swing-through gait   Gait Analysis Deviations decreased cheryl;decreased step  length   PT Discharge Planning   PT Plan Amb, transfers, LE ther exs   PT Discharge Recommendation (DC Rec) (S)  home with assist;home with home care physical therapy   PT Rationale for DC Rec Pending progress during stay at , pt currently amb well and significant distances using FWW but could use increased support at home and in future with potential [facility]/LTC placement. Recommend using most supportive AD all the time to reduce fall risk. Help from family or homecare services as able.   PT Brief overview of current status CGA for all mobility, Min A to get to edge of art   [facility]  OUTPATIENT PHYSICAL THERAPY EVALUATION  PLAN OF TREATMENT FOR OUTPATIENT REHABILITATION  (COMPLETE FOR INITIAL CLAIMS ONLY)  Patient's Last Name, First Name, M.I.  YOB: 1930  Gavin Patel                        Provider's Name  [facility] Medical Record No.  [id number]                             Onset Date:  01/18/23   Start of Care Date:  (P) 01/19/23   Type:     _X_PT   ___OT   ___SLP Medical Diagnosis:  (P) Generalized weakness              PT Diagnosis:  (P) Impaired functional mobility Visits from SOC:  1     See note for plan of treatment, functional goals and certification details    I CERTIFY THE NEED FOR THESE SERVICES FURNISHED UNDER        THIS PLAN OF TREATMENT AND WHILE UNDER MY CARE     (Physician co-signature of this document indicates review and certification of the therapy plan).

## 2023-01-19 NOTE — PROGRESS NOTES
Care Management Discharge Note    Discharge Date: 01/19/2023       Discharge Disposition: Home, Home Care    Discharge Services:  ([facility] for RN/PT/OT/HHA)    Discharge DME: None    Discharge Transportation: family or friend will provide (alf Spears will transport)    Private pay costs discussed: discussed options for private duty Home Care and given resources       Patient/family educated on Medicare website which has current facility and service quality ratings: yes    Education Provided on the Discharge Plan: Will discharge home (daughter will assist as needed) and [facility] services for RN/PT/OT/HHA. AVS per bedside RN.    Persons Notified of Discharge Plans: patient and daughter Tory   Patient/Family in Agreement with the Plan: yes    Handoff Referral Completed: CM coordinated with patient and daughter Tory. CM coordinated with bedside RN and MD. CM coordinated with Home Care. CM updated AVS.     Additional Information:  Chart reviewed. PT/OT pending for 1pm.     PT recommendations for home with assist; home PT.   OT recommendations for home with home OT.   CM met with patient and daughter Tory (at bedside). Both agreeable to Home Care services and request for alf Spears to be the primary contact for scheduling home care appointments.     [facility] can accept for RN/PT/OT/HHA ([facility],  Liaison confirmed and able to see within 48 hours of hospital discharge). Patient and daughter accept services. Daughter will provide the transportation home.    CM updated MD and bedside RN.   CM faxed Home Care referral and discharge orders.     [facility] referral sent per protocol.     CM also provided additional resources per [facility] request: [facility], [facility], Private Duty Home Care resources. Daughter already was given contact information for [facility].     Kelly Mahajan, RN

## 2023-01-19 NOTE — PROGRESS NOTES
"A&Ox4, neuro intact. very [place]. Hearing aids w/ pt. 1-2 L overnight. Blood pressures stable overnight, ivf infusing continuously. Uses urinal. K/Mg protocol, rechecks for 1/20 am. Fragile bruised skin.   PRIMARY DIAGNOSIS: \"GENERIC\" NURSING  OUTPATIENT/OBSERVATION GOALS TO BE MET BEFORE DISCHARGE:  1. ADLs back to baseline: No    2. Activity and level of assistance: Up with standby assistance.    3. Pain status: Pain free.    4. Return to near baseline physical activity: No     Discharge Planner Nurse   Safe discharge environment identified: No  Barriers to discharge: Yes       Entered by: Anabel Hernandez RN 01/19/2023 7:33 AM     Please review provider order for any additional goals.   Nurse to notify provider when observation goals have been met and patient is ready for discharge.  "

## 2023-01-19 NOTE — PROGRESS NOTES
"Notified Dr Daniel Ruiz @ 2100 \"WWED-08 D.V: Pt PLT lab 96. would you like me to hold heparin subq dose tonight? thank you\"    Response: provider held heparin injection       Notified Dr. Ruiz @ 2305: \"WWED-08 D.V.: Per parameter, notify provider if HR less than 50. Pt flipped from afib to sinus 2302. Hr dipped to 47. Sustaining 49-50 HR. do you want the parameter modified to contact you? Thank you\"     Response: Per Dr. Ruiz, no need for telemetry. It is not ordered and patient does not need continuous monitoring.   "

## 2023-01-19 NOTE — DISCHARGE INSTRUCTIONS
Home Care Services have been arranged.  Home Care Agency: [facility]   Home Care Agency Telephone: [phone]   Services: Nursing, PT, OT and Home Health Aide  Instructions: Agency will call to schedule start of care visit within 24-48 hours of hospital discharge.

## 2023-01-19 NOTE — CONSULTS
Care Management Initial Consult    General Information  Assessment completed with: Patient, Children, Patient and daughter Tory  Type of CM/SW Visit: Initial Assessment    Primary Care Provider verified and updated as needed: Yes   Readmission within the last 30 days: no previous admission in last 30 days      Reason for Consult: discharge planning  Advance Care Planning: Advance Care Planning Reviewed: no concerns identified, other (see comments) (Declined Honoring Choices)     General Information Comments: Lives alone    Communication Assessment  Patient's communication style: spoken language (English or Bilingual)    Hearing Difficulty or Deaf: yes   Wear Glasses or Blind: yes    Cognitive  Cognitive/Neuro/Behavioral: WDL                      Living Environment:   People in home: alone     Current living Arrangements: house      Able to return to prior arrangements: yes  Living Arrangement Comments: Lives alone    Family/Social Support:  Care provided by: self, child(korina)  Provides care for: no one  Marital Status:   Children          Description of Support System: Supportive, Involved    Support Assessment: Adequate family and caregiver support, Adequate social supports    Current Resources:   Patient receiving home care services: No     Community Resources: None  Equipment currently used at home: cane, straight, walker, rolling  Supplies currently used at home: Hearing Aid Batteries    Employment/Financial:  Employment Status: retired        Financial Concerns: No concerns identified           Lifestyle & Psychosocial Needs:  Social Determinants of Health     Tobacco Use: Not on file   Alcohol Use: Not on file   Financial Resource Strain: Not on file   Food Insecurity: Not on file   Transportation Needs: Not on file   Physical Activity: Not on file   Stress: Not on file   Social Connections: Not on file   Intimate Partner Violence: Not on file   Depression: Not on file   Housing Stability: Not on file        Functional Status:  Prior to admission patient needed assistance:   Dependent ADLs:: Ambulation-walker  Dependent IADLs:: Cleaning, Shopping, Cooking, Transportation  Assesssment of Functional Status: Not at baseline with mobility, Not at  functional baseline    Mental Health Status:          Chemical Dependency Status:              Values/Beliefs:  Spiritual, Cultural Beliefs, Jewish Practices, Values that affect care:                 Additional Information:   Patient presents to the emergency department today with complaints of generalized fatigue for the past few days.  His daughter is at the bedside and states that he does not seem to be himself lately.  He has had chills at home and a recent cough.  He states he feels very fatigued and is having a hard time getting around.  He was not able to get to the bathroom recently.  He denies any focal chest pain or abdominal pain .    Lives independently alone in a private residence. Daughter Tory at bedside. Reports he s independent with most I/ADLs. Uses a straight cane or roller-walker for ambulation; no home care services; drives only to the grocery store. No highways. Manages and self-administers own medications; daughter provides some meals and patient does light cooking. Tory cleans house; provides some transportation.     Explained MOON/Observation Status to patient and daughter, answered patient's questions; both verbalized understanding.  Patient and daughter have started looking for [facility]/LTC for patient to transition to. Provided them with Elderly Resource Book; Senior [facility] card, and Senior Housing Book. Plan is for patient to return home with Tory transporting on discharge. Other needs pending clinical progress.      CAROLYN WHITE RN/CM

## 2023-01-19 NOTE — PROGRESS NOTES
Physical Therapy Discharge Summary    Reason for therapy discharge:    Discharged to home with home therapy.    Progress towards therapy goal(s). See goals on Care Plan in [facility] electronic health record for goal details.  Goals met    Therapy recommendation(s):    Continued therapy is recommended.  Rationale/Recommendations:  EMILIANA MIGUEL.

## 2023-01-19 NOTE — DISCHARGE SUMMARY
[facility]  Hospitalist Discharge Summary      Date of Admission:  1/18/2023  Date of Discharge:  1/19/2023  Discharging Provider: Peter Ruiz MD  Discharge Service: Hospitalist Service    Discharge Diagnoses   Symptomatic Vitamin B12 deficiency - moderate/severe  Generalized Weakness  Physical Deconditioning  Acute Hypotension (asymptomatic)  HTN  Atrial Fibrillation   HLD    Follow-ups Needed After Discharge   Follow-up Appointments     Follow-up and recommended labs and tests      Follow up with primary care provider, Hiro Jackson, within 7 days for   hospital follow- up.    The following labs/tests are recommended:  B12 level to be repeated in 3-4   weeks.             Unresulted Labs Ordered in the Past 30 Days of this Admission     No orders found for last 31 day(s).      These results will be followed up by NA    Discharge Disposition   Admited to home care: PT, OT, RN, HHA  Discharged to home  Condition at discharge: Good      Hospital Course      Gavin Patel is a 89 year old male admitted on 1/18/2023. He has a hx of HTN, afib, HLD, polymyalgia rheumatica presents with generalized weakness and physical deconditioning, and had asymptomatic hypotension in the ED.    Hypotension resolved with IVF, conservative management, and encouraging oral intake. Found to have moderate/severe symptomatic vitamin B12 deficiency and started on replacement; this was discussed with him and his daughter and its role in contributing to his difficulty with ambulating and ADLs/iADLs. Did call his HCA/mPOA daughter Tory and fully updated her. PT/OT recommended home healthcare services including home PT, OT, RN, and HHA and these were all ordered and arranged. PCP follow-up and repeat B12 level in several weeks recommended.     Consultations This Hospital Stay   CARE MANAGEMENT / SOCIAL WORK IP CONSULT  OCCUPATIONAL THERAPY ADULT IP CONSULT  PHYSICAL THERAPY ADULT IP CONSULT  CARE MANAGEMENT  / SOCIAL WORK IP CONSULT    Code Status   No CPR- Do NOT Intubate    Time Spent on this Encounter   I, Peter Ruiz MD, personally saw the patient today and spent greater than 30 minutes discharging this patient.       Peter Ruiz MD  [address] [address]  Phone: [phone]  Fax: [phone]  ______________________________________________________________________    Physical Exam   Vital Signs: Temp: 97.9  F (36.6  C) Temp src: Oral BP: (!) 163/88 Pulse: 75   Resp: 20 SpO2: 96 % O2 Device: None (Room air) Oxygen Delivery: 2 LPM  Weight: 155 lbs 12.8 oz  GENERAL:  Alert, appears comfortable, in no acute distress, appears stated age   HEAD:  Normocephalic, without obvious abnormality, atraumatic   EYES:  PERRL, conjunctiva/corneas clear, no scleral icterus, EOM's intact   NOSE: Nares normal, septum midline, mucosa normal, no drainage   THROAT: Lips, mucosa, and tongue normal; teeth and gums normal, mouth moist   NECK: Supple, symmetrical, trachea midline   BACK:   Symmetric, no curvature, ROM normal   LUNGS:   Clear to auscultation bilaterally, no rales, rhonchi, or wheezing, symmetric chest rise on inhalation, respirations unlabored   CHEST WALL:  No tenderness or deformity   HEART:  Regular rate and rhythm, S1 and S2 normal, no murmur, rub, or gallop    ABDOMEN:   Soft, non-tender, bowel sounds active all four quadrants, no masses, no organomegaly, no rebound or guarding   EXTREMITIES: Extremities normal, atraumatic, no cyanosis or edema    SKIN: Dry to touch, no exanthems in the visualized areas   NEURO: Alert, oriented x 4, moves all four extremities freely/spontaneously   PSYCH: Cooperative, behavior is appropriate         Primary Care Physician   Hiro Jackson    Discharge Orders      Home care nursing referral      Reason for your hospital stay    Medical evaluation and physical and occupational therapy evaluations.     You  were found to have B12, also called cyanocobalamin, deficiency. Supplementation has been started and follow-up with your own primary care physician and the B12 level should be rechecked in several weeks.   Home healthcare services have been ordered/arranged for home PT, OT, RN, and home health aide.     Follow-up and recommended labs and tests    Follow up with primary care provider, Hiro Jackson, within 7 days for hospital follow- up.    The following labs/tests are recommended:  B12 level to be repeated in 3-4 weeks.     Activity    Your activity upon discharge: activity as tolerated and no driving for today     Diet    Follow this diet upon discharge: Orders Placed This Encounter      Combination Diet Regular Diet Adult       Significant Results and Procedures   Most Recent 3 CBC's:Recent Labs   Lab Test 01/19/23  0714 01/18/23  1327 10/02/20  1102   WBC 9.0 12.3* 9.1   HGB 12.9* 12.2* 13.7*   * 103* 102*   PLT 90* 96* 168     Most Recent 3 BMP's:Recent Labs   Lab Test 01/19/23  0714 01/19/23  0230 01/18/23  2114 01/18/23  1439 10/02/20  1102   *  --   --  135* 140   POTASSIUM 4.0 3.6 3.4* 3.5 3.7   CHLORIDE 110*  --   --  104 106   CO2 17*  --   --  23 28   BUN 21  --   --  22 13   CR 0.71  --   --  0.83 0.93   ANIONGAP 8  --   --  8 6   JUNE 7.6*  --   --  8.1* 8.3*   GLC 76  --   --  109 146*     Most Recent 2 LFT's:Recent Labs   Lab Test 10/02/20  1102   AST 14   ALT 9   ALKPHOS 106   BILITOTAL 1.4*     Most Recent 3 Troponin's:No lab results found.     Vitamin B12 level is 192.  ,   Results for orders placed or performed during the hospital encounter of 01/18/23   XR Chest Port 1 View    Narrative    EXAM: XR CHEST PORT 1 VIEW  LOCATION: [facility]  DATE/TIME: 1/18/2023 2:26 PM    INDICATION: cough  COMPARISON: None.      Impression    IMPRESSION: No acute cardiopulmonary abnormality.       Discharge Medications   Current Discharge Medication List      START taking  these medications    Details   cyanocobalamin (VITAMIN B-12) 1000 MCG tablet Take 1 tablet (1,000 mcg) by mouth daily for 60 days  Qty: 60 tablet, Refills: 0    Associated Diagnoses: Generalized muscle weakness; Vitamin B12 deficiency (non anemic); Physical deconditioning         CONTINUE these medications which have NOT CHANGED    Details   aspirin 81 MG EC tablet [ASPIRIN 81 MG EC TABLET] Take 81 mg by mouth at bedtime.       atenolol (TENORMIN) 50 MG tablet Take 50 mg by mouth daily      latanoprost (XALATAN) 0.005 % ophthalmic solution [LATANOPROST (XALATAN) 0.005 % OPHTHALMIC SOLUTION] Administer 1 drop to both eyes bedtime.      lisinopril (ZESTRIL) 20 MG tablet Take 20 mg by mouth daily      sertraline (ZOLOFT) 25 MG tablet Take 25 mg by mouth daily      simvastatin (ZOCOR) 40 MG tablet [SIMVASTATIN (ZOCOR) 40 MG TABLET] Take 20 mg by mouth bedtime.       MEDICATION CANNOT BE REORDERED - PLEASE MANUALLY REORDER AND DISCONTINUE THE OLD ORDER [OMEGA-3 FATTY ACIDS-VITAMIN E (FISH OIL) 1,000 MG CAP] Take 2 capsules by mouth daily.           Allergies   No Known Allergies

## 2023-01-19 NOTE — PROGRESS NOTES
[facility]    Medicine Progress Note - Hospitalist Service    Date of Admission:  1/18/2023    Assessment & Plan      Gavin Patel is a 89 year old male admitted on 1/18/2023. He has a hx of HTN, afib, HLD, polymyalgia rheumatica presents with generalized weakness and physical deconditioning, and had hypotension in the ED.    Hypotension resolved. Found to have moderate/severe symptomatic vitamin B12 deficiency certainly contributing to his falls/recurrent falls and difficulty with ambulating and ADLs/iADLs. Did call his HCA/mPOA daughter Tory and fully updated her. PT/OT evals pending. Appreciate CM/SW assist.     Acute Hypotension - occurring after initial HMS intake  -MAP < 65 mmHg - IVF bolus ordered STAT  -Lactic acid ordered STAT  -Hold home atenolol and lisinopril (none given yet)  -Discussed POC with RN directly    Symptomatic Vitamin B12 deficiency - moderate/severe  Generalized Weakness  Physical Deconditioning  -Discontinue potential deliriogenic medications/polypharmacy.   -Check thyroid cascade (TSH first) as well as cyanocobalamin and folic acid levels - evaluate/screen for endocrine and metabolic etiologies, respectively.   -Last B12 checked in 2020 was already borderline low in 250s. Follow-up ordered B12.   -B12 is deficient, 192 and given physiologic 6-months supply/storage and level is this low, would consider this moderate/severe deficiency.   -Order 500 mcg every day to start, consider outpatient injections if oral supplementation not adequate, PCP follow-up   -Delirium precautions.   -Fall precautions.   -If agitated or develops acute delirium, utilize verbal redirection first, and involve patient's family/contacts if available. Pharmacologic as-needed interventions as second-line, and would only judiciously consider 1:1 sitter and/or restraints if patient becomes a physical danger to self and/or to others/staff or if previous interventions unsuccessful or not  effective.  -PT  -OT  -CM/SW for disposition/placement/facility / safety discharge planning    HTN  Atrial Fibrillation   -Order home medications and ordered specified hold parameters given acute hypotension - hold home atenolol and lisinopril.   -Optimize potassium and magnesium. Keep K > 4.0, Mag > 2.0.   -Consider RN potassium and magnesium replacement protocols.   -If any acute sustained rapid rates develop, obtain EKG to evaluate for RVR and initiate cardiac telemetry and update [facility] team.     HLD  -Order home statin.         Diet: Combination Diet Regular Diet Adult    DVT Prophylaxis: Pneumatic Compression Devices, Anti-embolisim stockings (TEDs) and Ambulate every shift  Cunningham Catheter: Not present  Lines: None     Cardiac Monitoring: None  Code Status: No CPR- Do NOT Intubate      Clinically Significant Risk Factors Present on Admission                # Thrombocytopenia: Lowest platelets = 90 in last 2 days, will monitor for bleeding   # Hypertension: home medication list includes antihypertensive(s)   # Acute Respiratory Failure: Documented O2 saturation < 91%.  Continue supplemental oxygen as needed             Disposition Plan      Expected Discharge Date: 01/19/2023      Destination: home;home with help/services            Peter Ruiz MD  Hospitalist Service  [facility]  Securely message with [employer] (more info)  Text page via [employer] Paging/Directory   ______________________________________________________________________    Interval History   No acute events overnight.    He remains in good spirits. Discussed his w/u with him, including noting moderate/severe B12 deficiency. No report of chest pain, shortness of breath, or other new complaints. Discussed plan of care with patient. Answered all questions to patient's verbalized understanding and satisfaction.    Did call his HCA/mPOA daughter Tory and fully updated her.    Discussed with RN directly as well.     Physical Exam    Vital Signs: Temp: 97.6  F (36.4  C) Temp src: Oral BP: 117/68 Pulse: 63   Resp: 18 SpO2: 95 % O2 Device: Nasal cannula Oxygen Delivery: 2 LPM  Weight: 155 lbs 12.8 oz    GENERAL:  Alert, appears comfortable, in no acute distress, appears stated age   HEAD:  Normocephalic, without obvious abnormality, atraumatic   EYES:  PERRL, conjunctiva/corneas clear, no scleral icterus, EOM's intact   NOSE: Nares normal, septum midline, mucosa normal, no drainage   THROAT: Lips, mucosa, and tongue normal; teeth and gums normal, mouth moist   NECK: Supple, symmetrical, trachea midline   BACK:   Symmetric, no curvature, ROM normal   LUNGS:   Clear to auscultation bilaterally, no rales, rhonchi, or wheezing, symmetric chest rise on inhalation, respirations unlabored   CHEST WALL:  No tenderness or deformity   HEART:  Regular rate and rhythm, S1 and S2 normal, no murmur, rub, or gallop    ABDOMEN:   Soft, non-tender, bowel sounds active all four quadrants, no masses, no organomegaly, no rebound or guarding   EXTREMITIES: Extremities normal, atraumatic, no cyanosis or edema    SKIN: Dry to touch, no exanthems in the visualized areas   NEURO: Alert, oriented x 4, moves all four extremities freely/spontaneously   PSYCH: Cooperative, behavior is appropriate      Medical Decision Making     58 MINUTES SPENT BY ME on the date of service doing chart review, history, exam, documentation & further activities per the note.      Data     I have personally reviewed the following data over the past 24 hrs:    9.0  \   12.9 (L)   / 90 (L)     135 (L) 110 (H) 21 /  76   4.0 17 (L) 0.71 \       TSH: 0.33 T4: N/A A1C: N/A       Procal: N/A CRP: N/A Lactic Acid: 1.6         Imaging results reviewed over the past 24 hrs:   Recent Results (from the past 24 hour(s))   XR Chest Port 1 View    Narrative    EXAM: XR CHEST PORT 1 VIEW  LOCATION: [facility]  DATE/TIME: 1/18/2023 2:26 PM    INDICATION: cough  COMPARISON: None.       Impression    IMPRESSION: No acute cardiopulmonary abnormality.

## 2023-01-19 NOTE — PROGRESS NOTES
01/19/23 1300   Appointment Info   Signing Clinician's Name / Credentials (OT) Janie Blakely, MOTR/L, CLT   Quick Adds   Quick Adds Certification   Living Environment   People in Home alone   Current Living Arrangements other (see comments);house   Home Accessibility   (one level with walk-out basement)   Transportation Anticipated family or friend will provide   Self-Care   Usual Activity Tolerance good   Current Activity Tolerance moderate   Regular Exercise Yes  (walking)   Activity/Exercise Type walking   Equipment Currently Used at Home walker, rolling;raised toilet seat;grab bar, tub/shower;shower chair   Fall history within last six months no   Activity/Exercise/Self-Care Comment indep with most IADL except outdoor shoveling   General Information   Onset of Illness/Injury or Date of Surgery 01/18/23   Referring Physician Dr. Peter Ruiz   Patient/Family Therapy Goal Statement (OT) maybe TCU for a while   Additional Occupational Profile Info/Pertinent History of Current Problem mod:a-fib;polymyalgia   Cognitive Status Examination   Orientation Status orientation to person, place and time   Affect/Mental Status (Cognitive) WFL   Visual Perception   Visual Impairment/Limitations WFL   Sensory   Sensory Comments numbness in bilat hands/feet at baseline   Pain Assessment   Patient Currently in Pain No   Posture   Posture not impaired   Range of Motion Comprehensive   General Range of Motion no range of motion deficits identified   Strength Comprehensive (MMT)   General Manual Muscle Testing (MMT) Assessment no strength deficits identified   Muscle Tone Assessment   Muscle Tone Quick Adds No deficits were identified   Coordination   Upper Extremity Coordination No deficits were identified   Bed Mobility   Comment (Bed Mobility) decreased   Transfers   Transfer Comments SBA   Balance   Balance Comments decreased   Activities of Daily Living   BADL Assessment/Intervention lower body dressing;grooming   Lower  Body Dressing Assessment/Training   [place] Level (Lower Body Dressing) supervision   Grooming Assessment/Training   [place] Level (Grooming) supervision   Clinical Impression   Criteria for Skilled Therapeutic Interventions Met (OT) Yes, treatment indicated   OT Diagnosis decr ADL indep/safety   OT Problem List-Impairments impacting ADL activity tolerance impaired;balance;cognition;flexibility;mobility;strength   Assessment of Occupational Performance 3-5 Performance Deficits   Identified Performance Deficits dressing, home mgmt, bathing   Planned Therapy Interventions (OT) ADL retraining;strengthening;transfer training;home program guidelines   Clinical Decision Making Complexity (OT) moderate complexity   Risk & Benefits of therapy have been explained care plan/treatment goals reviewed;patient   OT Total Evaluation Time   OT Eval, Moderate Complexity Minutes (88381) 10   Therapy Certification   Medical Diagnosis BPH with urinary retention; a-fib   Start of Care Date 01/19/23   Certification date from 01/19/23   Certification date to 02/19/23   OT Goals   Therapy Frequency (OT) Daily   OT Predicted Duration/Target Date for Goal Attainment 01/23/23   OT Goals Lower Body Dressing;Toilet Transfer/Toileting   OT: Lower Body Dressing Modified independent;within precautions   OT: Toilet Transfer/Toileting Modified independent;cleaning and garment management;toilet transfer   Interventions   Interventions Quick Adds Self-Care/Home Management   OT Discharge Planning   OT Plan UE ex progrm;cog?   OT Discharge Recommendation (DC Rec) (S)  home with home care occupational therapy   OT Rationale for DC Rec pt may need incr services   OT Brief overview of current status SBA for BADL   Total Session Time   Total Session Time (sum of timed and untimed services) 10    [facility]  OUTPATIENT OCCUPATIONAL THERAPY  EVALUATION  PLAN OF TREATMENT FOR OUTPATIENT REHABILITATION  (COMPLETE FOR  INITIAL CLAIMS ONLY)  Patient's Last Name, First Name, M.I.  YOB: 1930  Gavin Patel                          Provider's Name  [facility] Medical Record No.  [id number]                             Onset Date:  01/18/23   Start of Care Date:  01/19/23   Type:     ___PT   _X_OT   ___SLP Medical Diagnosis:  BPH with urinary retention; a-fib                    OT Diagnosis:  decr ADL indep/safety Visits from SOC:  1     See note for plan of treatment, functional goals and certification details    I CERTIFY THE NEED FOR THESE SERVICES FURNISHED UNDER        THIS PLAN OF TREATMENT AND WHILE UNDER MY CARE     (Physician co-signature of this document indicates review and certification of the therapy plan).

## 2023-01-19 NOTE — PROGRESS NOTES
Occupational Therapy Discharge Summary    Reason for therapy discharge:    Discharged to home.    Progress towards therapy goal(s). See goals on Care Plan in [facility] electronic health record for goal details.  Goals partially met.  Barriers to achieving goals:   discharge from facility.    Therapy recommendation(s):    Continued therapy is recommended.  Rationale/Recommendations:  Follow-up for best outcomes/increased strength/endurance for home alone.

## 2023-01-23 ENCOUNTER — TELEPHONE (OUTPATIENT)
Dept: FAMILY MEDICINE | Facility: CLINIC | Age: 88
End: 2023-01-23
Payer: MEDICARE

## 2023-01-23 ENCOUNTER — MEDICAL CORRESPONDENCE (OUTPATIENT)
Dept: HEALTH INFORMATION MANAGEMENT | Facility: CLINIC | Age: 88
End: 2023-01-23

## 2023-01-23 NOTE — TELEPHONE ENCOUNTER
[facility] called for the following orders:    Skilled nursing  1x week for 1 week    For assessing respiratory, cardiovascular, and pain management.    PT  Eval and treat.    Verbal orders given.

## 2023-01-24 ENCOUNTER — PATIENT OUTREACH (OUTPATIENT)
Dept: CARE COORDINATION | Facility: CLINIC | Age: 88
End: 2023-01-24
Payer: MEDICARE

## 2023-01-24 NOTE — PROGRESS NOTES
Clinic Care Coordination Contact  [facility]       Clinical Data: Care Coordinator Outreach  Outreach attempted x 1. Patient phone was busy. Writer attempted to reach out to patient twice this morning, but both times his phone was busy.   Plan: Care Coordinator will try to reach patient again in 1-2 business days.    David Myhre, RN  CCC RN

## 2023-01-25 ENCOUNTER — PATIENT OUTREACH (OUTPATIENT)
Dept: CARE COORDINATION | Facility: CLINIC | Age: 88
End: 2023-01-25
Payer: MEDICARE

## 2023-01-25 ASSESSMENT — ACTIVITIES OF DAILY LIVING (ADL): DEPENDENT_IADLS:: CLEANING;COOKING;LAUNDRY;TRANSPORTATION

## 2023-01-25 NOTE — PROGRESS NOTES
Clinic Care Coordination Contact  [facility]: Post-Discharge Note  SITUATION                                                      Admission:    Admission Date: 01/18/23   Reason for Admission: generalized weakness and physcial deconditioning.  Discharge:   Discharge Date: 01/19/23  Discharge Diagnosis: generalized weakness, physcial deconditioning, acuete hypotension, sytomatic vitamin B12 deficiency, HTN atrial fibrillation, HLD    BACKGROUND                                                      Per hospital discharge summary and inpatient provider notes:  Gavin Patel is a 89 year old male admitted on 1/18/2023. He has a hx of HTN, afib, HLD, polymyalgia rheumatica presents with generalized weakness and physical deconditioning, and had asymptomatic hypotension in the ED.     Hypotension resolved with IVF, conservative management, and encouraging oral intake. Found to have moderate/severe symptomatic vitamin B12 deficiency and started on replacement; this was discussed with him and his daughter and its role in contributing to his difficulty with ambulating and ADLs/iADLs. Did call his HCA/mPOA daughter oTry and fully updated her. PT/OT recommended home healthcare services including home PT, OT, RN, and HHA and these were all ordered and arranged. PCP follow-up and repeat B12 level in several weeks recommended.     ASSESSMENT           Discharge Assessment  How are you doing now that you are home?: I am getting along pretty good. I am doing my exercises.  How are your symptoms? (Red Flag symptoms escalate to triage hotline per guidelines): Improved  Do you feel your condition is stable enough to be safe at home until your provider visit?: Yes  Does the patient have their discharge instructions? : Yes  Does the patient have questions regarding their discharge instructions? : No  Were you started on any new medications or were there changes to any of your previous medications? : Yes  Does the patient  have all of their medications?: Yes  Do you have questions regarding any of your medications? : No  Do you have all of your needed medical supplies or equipment (DME)?  (i.e. oxygen tank, CPAP, cane, etc.): Yes  Discharge follow-up appointment scheduled within 14 calendar days? : Yes ([facility]  Discharge Follow Up Appointment Scheduled with?: Primary Care Provider         Post-op (Clinicians Only)  Did the patient have surgery or a procedure: No  Fever: No  Chills: No  Eating & Drinking: eating and drinking without complaints/concerns  PO Intake: regular diet  Bowel Function: loose stools  Date of last BM: 01/25/23  Urinary Status: voiding without complaint/concerns    Care Management       CCC RN spoke with patient today. He stated he was doing well at home. Home Care RN has already been out to start care. He stated he is expecting a call from home care PT this week, as to when they will start. [facility] he said he will be going for the majority of his primary care. He stated only sees [facility]. [facility] only occasionally. Patient reported he is taking his medications as directed. He stated he has good support from his family. He stated his daughter checks on him daily. He declined enrolling in [facility] at this time. Patient said he was grateful for the follow up call.     Care Mgmt General Assessment  Referral  Referral Source: Care Team  Health Care Home/Utilization  Preferred Hospital: [facility]  [phone]  Preferred Urgent Care: Other ([facility])  Living Situation  Current living arrangement:: I live alone  Type of residence:: Private home - stairs  Resources  Patient receiving home care services:: Yes  Skilled Home Care Services: Skilled Nursing;Physicial Therapy;Occupational Therapy  Community Resources: None  Supplies Currently Used at Home: Hearing Aid  Batteries  Equipment Currently Used at Home: walker, rolling;raised toilet seat;grab bar, tub/shower;shower chair  Employment Status: retired  Psychosocial  Congregational or spiritual beliefs that impact treatment:: No  Mental health DX:: No  Mental health management concern (GOAL):: No  Chemical Dependency Status: No Current Concerns  Informal Support system:: Children;Family  Functional Status  Dependent ADLs:: Ambulation-walker  Dependent IADLs:: Cleaning;Cooking;Laundry;Transportation  Bed or wheelchair confined:: No  Fallen 2 or more times in the past year?: No  Any fall with injury in the past year?: No       PLAN                                                      Outpatient Plan:  Home with home care. Patient said home care has started.     No future appointments.      For any urgent concerns, please contact our 24 hour nurse triage line: [phone] ([phone])         David J Myhre, RN

## 2023-01-26 ENCOUNTER — TELEPHONE (OUTPATIENT)
Dept: FAMILY MEDICINE | Facility: CLINIC | Age: 88
End: 2023-01-26
Payer: MEDICARE

## 2023-01-26 ENCOUNTER — DOCUMENTATION ONLY (OUTPATIENT)
Dept: OTHER | Facility: CLINIC | Age: 88
End: 2023-01-26
Payer: MEDICARE

## 2023-01-26 NOTE — TELEPHONE ENCOUNTER
[facility] calling for the following orders:    Home health PT  1x week for 4 weeks  1 every other week for 2 visits    Gait training, functional strength, home exercise program, and balance training.    Verbal orders given.